# Patient Record
Sex: FEMALE | Race: BLACK OR AFRICAN AMERICAN | NOT HISPANIC OR LATINO | Employment: UNEMPLOYED | URBAN - METROPOLITAN AREA
[De-identification: names, ages, dates, MRNs, and addresses within clinical notes are randomized per-mention and may not be internally consistent; named-entity substitution may affect disease eponyms.]

---

## 2017-01-05 DIAGNOSIS — F98.8 ADD (ATTENTION DEFICIT DISORDER): ICD-10-CM

## 2017-01-05 RX ORDER — DEXTROAMPHETAMINE SACCHARATE, AMPHETAMINE ASPARTATE, DEXTROAMPHETAMINE SULFATE AND AMPHETAMINE SULFATE 7.5; 7.5; 7.5; 7.5 MG/1; MG/1; MG/1; MG/1
30 TABLET ORAL 2 TIMES DAILY
Qty: 60 TABLET | Refills: 0 | Status: SHIPPED | OUTPATIENT
Start: 2017-01-05 | End: 2017-01-31 | Stop reason: SDUPTHER

## 2017-01-25 ENCOUNTER — OFFICE VISIT (OUTPATIENT)
Dept: FAMILY MEDICINE CLINIC | Facility: CLINIC | Age: 27
End: 2017-01-25

## 2017-01-25 VITALS
TEMPERATURE: 98.7 F | HEART RATE: 117 BPM | OXYGEN SATURATION: 97 % | WEIGHT: 157.6 LBS | BODY MASS INDEX: 29 KG/M2 | SYSTOLIC BLOOD PRESSURE: 106 MMHG | HEIGHT: 62 IN | DIASTOLIC BLOOD PRESSURE: 66 MMHG

## 2017-01-25 DIAGNOSIS — J01.90 ACUTE SINUSITIS, RECURRENCE NOT SPECIFIED, UNSPECIFIED LOCATION: ICD-10-CM

## 2017-01-25 DIAGNOSIS — M35.2 BEHCET'S SYNDROME (HCC): Primary | ICD-10-CM

## 2017-01-25 PROCEDURE — 99214 OFFICE O/P EST MOD 30 MIN: CPT | Performed by: FAMILY MEDICINE

## 2017-01-25 RX ORDER — PREDNISONE 20 MG/1
20 TABLET ORAL 3 TIMES DAILY
Qty: 21 TABLET | Refills: 0 | Status: SHIPPED | OUTPATIENT
Start: 2017-01-25 | End: 2017-02-01

## 2017-01-25 RX ORDER — LEVOFLOXACIN 500 MG/1
500 TABLET, FILM COATED ORAL DAILY
Qty: 10 TABLET | Refills: 0 | Status: SHIPPED | OUTPATIENT
Start: 2017-01-25 | End: 2017-02-04

## 2017-01-25 RX ORDER — OXYCODONE AND ACETAMINOPHEN 10; 325 MG/1; MG/1
1 TABLET ORAL EVERY 6 HOURS PRN
Qty: 30 TABLET | Refills: 0 | Status: SHIPPED | OUTPATIENT
Start: 2017-01-25 | End: 2017-01-31 | Stop reason: SDUPTHER

## 2017-01-25 NOTE — MR AVS SNAPSHOT
Grisel Allen   1/25/2017 3:45 PM   Office Visit    Provider:  Tanya Rivers MD   Department:  [facility]   Dept Phone:  [phone]                Your Full Care Plan              Today's Medication Changes          These changes are accurate as of: 1/25/17  4:36 PM.  If you have any questions, ask your nurse or doctor.               New Medication(s)Ordered:     levoFLOXacin 500 MG tablet   Commonly known as:  LEVAQUIN   Take 1 tablet by mouth Daily for 10 days.   Started by:  Tanya Rivers MD       predniSONE 20 MG tablet   Commonly known as:  DELTASONE   Take 1 tablet by mouth 3 (Three) Times a Day for 7 days.   Started by:  Tanya Rivers MD            Where to Get Your Medications      These medications were sent to [facility] [address] [place] [address] [address] AT [facility] [phone] [facility] [phone]   [address], [facility] [address]    Hours:  24-hours Phone:  [phone]     levoFLOXacin 500 MG tablet    predniSONE 20 MG tablet         You can get these medications from any pharmacy     Bring a paper prescription for each of these medications     oxyCODONE-acetaminophen  MG per tablet                  Your Updated Medication List          This list is accurate as of: 1/25/17  4:36 PM.  Always use your most recent med list.                ALPRAZolam 1 MG tablet   Commonly known as:  XANAX   Take 1 tablet by mouth 4 (Four) Times a Day As Needed for anxiety for up to 120 days.       amphetamine-dextroamphetamine 30 MG tablet   Commonly known as:  ADDERALL   Take 1 tablet by mouth 2 (Two) Times a Day.       levoFLOXacin 500 MG tablet   Commonly known as:  LEVAQUIN   Take 1 tablet by mouth Daily for 10 days.       MONONESSA 0.25-35 MG-MCG per tablet   Generic drug:  norgestimate-ethinyl estradiol       oxyCODONE-acetaminophen  MG per tablet   Commonly known as:   "PERCOCET   Take 1 tablet by mouth Every 6 (Six) Hours As Needed for moderate pain (4-6).       predniSONE 20 MG tablet   Commonly known as:  DELTASONE   Take 1 tablet by mouth 3 (Three) Times a Day for 7 days.               You Were Diagnosed With        Codes Comments    Behcet's syndrome    -  Primary ICD-10-CM: M35.2  ICD-9-CM: 136.1     Acute sinusitis, recurrence not specified, unspecified location     ICD-10-CM: J01.90  ICD-9-CM: 461.9       Instructions     None    Patient Instructions History      [employer] Signup     [facility] [employer] allows you to send messages to your doctor, view your test results, renew your prescriptions, schedule appointments, and more. To sign up, go to [employer] and click on the Sign Up Now link in the New User? box. Enter your [employer] Activation Code exactly as it appears below along with the last four digits of your Social Security Number and your Date of Birth () to complete the sign-up process. If you do not sign up before the expiration date, you must request a new code.    [employer] Activation Code: [email or web address]  Expires: 2017  4:36 PM    If you have questions, you can email [email or web address] or call [phone] to talk to our [employer] staff. Remember, [employer] is NOT to be used for urgent needs. For medical emergencies, dial 911.               Other Info from Your Visit           Allergies     No Known Allergies      Reason for Visit     Nasal Congestion     Cough     Sore Throat           Vital Signs     Blood Pressure Pulse Temperature Height Weight    106/66 (BP Location: Left arm, Patient Position: Sitting, Cuff Size: Adult) 117 98.7 °F (37.1 °C) (Oral) 62\" (157.5 cm) 157 lb 9.6 oz (71.5 kg)    Last Menstrual Period Oxygen Saturation Body Mass Index Smoking Status       2017 97% 28.83 kg/m2 Former Smoker       Problems and Diagnoses Noted     Behcet's syndrome    Acute sinus infection          "

## 2017-01-25 NOTE — PROGRESS NOTES
Subjective   Grisel Allen is a 26 y.o. female.     History of Present Illness Grisel Allen 26 y.o. female who presents for evaluation of sinus pressure and congestion. Symptoms include ear pressure, sore throat, nasal blockage, post nasal drip and productive cough.  Onset of symptoms was 5 days ago, gradually worsening since that time. Patient denies shortness of breath, wheezing, fever, vomiting.   Evaluation to date: none Treatment to date:  none.  Also continues to complain of a flare of Behcet's      The following portions of the patient's history were reviewed and updated as appropriate: allergies, current medications, past family history, past medical history, past social history, past surgical history and problem list.    Review of Systems   Constitutional: Positive for fatigue. Negative for fever.   HENT: Positive for congestion, postnasal drip, sinus pressure and sore throat.    Respiratory: Positive for cough. Negative for shortness of breath and wheezing.    Gastrointestinal: Negative for nausea and vomiting.   Musculoskeletal: Positive for arthralgias.   Skin: Positive for wound.       Objective   Physical Exam   Constitutional: She appears well-developed and well-nourished.   HENT:   Right Ear: Tympanic membrane normal.   Left Ear: Tympanic membrane normal.   Mouth/Throat: Posterior oropharyngeal erythema present.   Eyes: Conjunctivae and EOM are normal. Pupils are equal, round, and reactive to light.   Cardiovascular: Normal rate, regular rhythm, normal heart sounds and intact distal pulses.    Pulmonary/Chest: Effort normal and breath sounds normal.   Lymphadenopathy:     She has no cervical adenopathy.   Skin: There is erythema (2 ulcerative lesions medial aspect of both breasts).   Psychiatric: She has a normal mood and affect. Her behavior is normal. Judgment and thought content normal.   Vitals reviewed.      Assessment/Plan   Grisel was seen today for nasal congestion, cough and sore  throat.    Diagnoses and all orders for this visit:    Behcet's syndrome  -     predniSONE (DELTASONE) 20 MG tablet; Take 1 tablet by mouth 3 (Three) Times a Day for 7 days.  -     oxyCODONE-acetaminophen (PERCOCET)  MG per tablet; Take 1 tablet by mouth Every 6 (Six) Hours As Needed for moderate pain (4-6).    Acute sinusitis, recurrence not specified, unspecified location  -     levoFLOXacin (LEVAQUIN) 500 MG tablet; Take 1 tablet by mouth Daily for 10 days.

## 2017-01-31 DIAGNOSIS — M35.2 BEHCET'S SYNDROME (HCC): ICD-10-CM

## 2017-01-31 DIAGNOSIS — F98.8 ADD (ATTENTION DEFICIT DISORDER): ICD-10-CM

## 2017-01-31 RX ORDER — OXYCODONE AND ACETAMINOPHEN 10; 325 MG/1; MG/1
1 TABLET ORAL EVERY 6 HOURS PRN
Qty: 30 TABLET | Refills: 0 | Status: SHIPPED | OUTPATIENT
Start: 2017-01-31 | End: 2017-02-24 | Stop reason: SDUPTHER

## 2017-01-31 RX ORDER — DEXTROAMPHETAMINE SACCHARATE, AMPHETAMINE ASPARTATE, DEXTROAMPHETAMINE SULFATE AND AMPHETAMINE SULFATE 7.5; 7.5; 7.5; 7.5 MG/1; MG/1; MG/1; MG/1
30 TABLET ORAL 2 TIMES DAILY
Qty: 60 TABLET | Refills: 0 | Status: SHIPPED | OUTPATIENT
Start: 2017-01-31 | End: 2017-02-24

## 2017-01-31 NOTE — TELEPHONE ENCOUNTER
Request refill. Last OV 12/9/16 for adderall. She is also needing a refill on percocet she did get a refill on 1/25/17. She states that she has been in more pain.

## 2017-02-10 DIAGNOSIS — J01.90 ACUTE SINUSITIS, RECURRENCE NOT SPECIFIED, UNSPECIFIED LOCATION: ICD-10-CM

## 2017-02-10 DIAGNOSIS — M35.2 BEHCET'S SYNDROME (HCC): ICD-10-CM

## 2017-02-10 RX ORDER — LEVOFLOXACIN 500 MG/1
TABLET, FILM COATED ORAL
Qty: 10 TABLET | Refills: 0 | OUTPATIENT
Start: 2017-02-10

## 2017-02-10 RX ORDER — PREDNISONE 20 MG/1
TABLET ORAL
Qty: 21 TABLET | Refills: 0 | OUTPATIENT
Start: 2017-02-10

## 2017-02-24 ENCOUNTER — OFFICE VISIT (OUTPATIENT)
Dept: FAMILY MEDICINE CLINIC | Facility: CLINIC | Age: 27
End: 2017-02-24

## 2017-02-24 VITALS
WEIGHT: 157 LBS | HEART RATE: 85 BPM | BODY MASS INDEX: 28.89 KG/M2 | DIASTOLIC BLOOD PRESSURE: 72 MMHG | RESPIRATION RATE: 16 BRPM | TEMPERATURE: 98.3 F | HEIGHT: 62 IN | OXYGEN SATURATION: 96 % | SYSTOLIC BLOOD PRESSURE: 118 MMHG

## 2017-02-24 DIAGNOSIS — M35.9 AUTOIMMUNE DISEASE (HCC): ICD-10-CM

## 2017-02-24 DIAGNOSIS — F98.8 ADD (ATTENTION DEFICIT DISORDER): ICD-10-CM

## 2017-02-24 DIAGNOSIS — M35.2 BEHCET'S SYNDROME (HCC): ICD-10-CM

## 2017-02-24 DIAGNOSIS — F41.9 ANXIETY: Primary | ICD-10-CM

## 2017-02-24 DIAGNOSIS — F34.1 DYSTHYMIA: ICD-10-CM

## 2017-02-24 PROCEDURE — 99213 OFFICE O/P EST LOW 20 MIN: CPT | Performed by: PHYSICIAN ASSISTANT

## 2017-02-24 RX ORDER — OXYCODONE AND ACETAMINOPHEN 10; 325 MG/1; MG/1
1 TABLET ORAL EVERY 6 HOURS PRN
Qty: 30 TABLET | Refills: 0 | Status: SHIPPED | OUTPATIENT
Start: 2017-02-24 | End: 2017-04-14

## 2017-02-24 RX ORDER — ALPRAZOLAM 1 MG/1
1 TABLET ORAL 2 TIMES DAILY PRN
Qty: 60 TABLET | Refills: 2
Start: 2017-02-24 | End: 2018-03-29

## 2017-02-24 RX ORDER — PREDNISONE 20 MG/1
20 TABLET ORAL 2 TIMES DAILY
Qty: 14 TABLET | Refills: 0 | Status: SHIPPED | OUTPATIENT
Start: 2017-02-24 | End: 2017-04-14

## 2017-02-24 RX ORDER — DEXTROAMPHETAMINE SACCHARATE, AMPHETAMINE ASPARTATE, DEXTROAMPHETAMINE SULFATE AND AMPHETAMINE SULFATE 3.75; 3.75; 3.75; 3.75 MG/1; MG/1; MG/1; MG/1
15 TABLET ORAL 2 TIMES DAILY
Qty: 60 TABLET | Refills: 0
Start: 2017-02-24 | End: 2017-04-14

## 2017-02-24 NOTE — PROGRESS NOTES
Subjective   Grisel Allen is a 26 y.o. female.     History of Present Illness     Grisel Allen female 26 y.o. who presents today for follow up of Depression, Anxiety and ADD.  She reports she is doing well on meds and wants to cont.  Patient comes in today to see me for the first time. Is a long term patient of Dr. Rivers. Seeing me today due to Dr. Rivers no longer working here. Discussed with pt today that I will not be writing pain medications or benzodiazepines for them past a 90 day window and that we will be aggressively decreasing doses along the way. They have a choice of being sent to pain management for any narcotics taken, if they wish. I will be getting them off of any benzos.they are taking, or they can see a psychiatrist to discuss this further (list of names and phone numbers given to pt today). They are also aware that they are free to transfer their care to a different facility before the 90 days are up if they wish. After that point, they are aware that they will no longer be seen in this facility by our doctors. Again, reiterated the fact that those types of medications will not be written here past 90 days, and pt voices understanding.. Onset of symptoms was approximately several years ago.  She denies current suicidal and homicidal ideation. Risk factors are chronic illness and chronic pain.  Previous treatment includes current Rx.  She complains of the following medication side effects: none.  The patient has previously been in counseling..    The patient has read and signed the [facility] Controlled Substance Contract.  I will continue to see patient for regular follow up appointments.  They are well controlled on their medication.  DIDI has been reviewed by me and is updated every 3 months. The patient is aware of the potential for addiction and dependence.    She is having a flare of her ulcers vaginally, buttock, and mouth    The following portions of the patient's history were  reviewed and updated as appropriate: allergies, current medications, past family history, past medical history, past social history, past surgical history and problem list.    Review of Systems   Constitutional: Negative for activity change, appetite change and unexpected weight change.   HENT: Positive for mouth sores. Negative for nosebleeds and trouble swallowing.    Eyes: Negative for pain and visual disturbance.   Respiratory: Negative for chest tightness, shortness of breath and wheezing.    Cardiovascular: Negative for chest pain and palpitations.   Gastrointestinal: Positive for rectal pain. Negative for abdominal pain and blood in stool.   Endocrine: Negative.    Genitourinary: Negative for difficulty urinating and hematuria.   Musculoskeletal: Positive for joint swelling.   Skin: Positive for wound. Negative for color change and rash.   Allergic/Immunologic: Negative.    Neurological: Negative for syncope and speech difficulty.   Hematological: Negative for adenopathy.   Psychiatric/Behavioral: Negative for agitation and confusion.   All other systems reviewed and are negative.      Objective   Physical Exam   Constitutional: She is oriented to person, place, and time. She appears well-developed and well-nourished. No distress.   HENT:   Head: Normocephalic.   Right Ear: Tympanic membrane normal.   Left Ear: Tympanic membrane normal.   Mouth/Throat: Oral lesions (multiple ulcers on buccal membranes) present.   Eyes: Conjunctivae and EOM are normal. Pupils are equal, round, and reactive to light.   Neck: Normal range of motion. Neck supple.   Cardiovascular: Normal rate, regular rhythm and normal heart sounds.    No murmur heard.  Pulmonary/Chest: Effort normal and breath sounds normal.   Musculoskeletal: Normal range of motion.   Lymphadenopathy:     She has no cervical adenopathy.   Neurological: She is alert and oriented to person, place, and time.   Skin: Skin is warm and dry. No rash noted. She is not  diaphoretic. There is erythema (ulceration gluteal cleft without surrounding erythema; left labia minora to majora cluster of ulcers and no surrounding erythema; while centered).   Psychiatric: She has a normal mood and affect. Her behavior is normal. Judgment and thought content normal. Her affect is not inappropriate. She is not actively hallucinating. Cognition and memory are normal.   Nursing note and vitals reviewed.      Assessment/Plan   Problems Addressed this Visit        Immune and Lymphatic    Autoimmune disease       Other    Anxiety - Primary    ADD (attention deficit disorder)    Behcet's syndrome    Depression        I have reviewed the notes, assessments, and/or procedures performed by Chanel Resendez PA-C, I concur with her/his documentation of Grisel Allen.

## 2017-02-24 NOTE — PATIENT INSTRUCTIONS
Stop Adderall if it makes your heart pound and/or race.  It can effect you being able to fall asleep.  It can be habit forming.  Do not share you medication with anyone.  Do not drink alcohol with this medication.  Use the lowest effective dose.  Xanax is a controlled substance.  I only want you to take it as needed.  I do not want you to take it routinely.  Use the lowest effective dose.  It can be habit forming.  It can make you feel drowsy.   This could effect how you operate machinery, including a car.  Caution is advised.  I would avoid taking it and then driving.  Do not take this with alcohol.  Warned patient that this medication can cause drowsiness and impair them operating machinery, including driving a car.  Caution is advised.    Notify me if secondary skin infection

## 2017-05-06 ENCOUNTER — HOSPITAL ENCOUNTER (EMERGENCY)
Facility: HOSPITAL | Age: 27
Discharge: HOME | End: 2017-05-06
Attending: EMERGENCY MEDICINE | Admitting: EMERGENCY MEDICINE

## 2017-05-06 VITALS
BODY MASS INDEX: 30.91 KG/M2 | DIASTOLIC BLOOD PRESSURE: 76 MMHG | SYSTOLIC BLOOD PRESSURE: 120 MMHG | HEART RATE: 98 BPM | WEIGHT: 168 LBS | RESPIRATION RATE: 18 BRPM | OXYGEN SATURATION: 95 % | HEIGHT: 62 IN | TEMPERATURE: 98.6 F

## 2017-05-06 DIAGNOSIS — M35.2 BEHCET'S DISEASE (HCC): ICD-10-CM

## 2017-05-06 DIAGNOSIS — K13.70 LESION OF MOUTH: Primary | ICD-10-CM

## 2017-05-06 LAB
ANION GAP SERPL CALCULATED.3IONS-SCNC: 18.2 MMOL/L
BASOPHILS # BLD AUTO: 0.02 10*3/MM3 (ref 0–0.2)
BASOPHILS NFR BLD AUTO: 0.1 % (ref 0–1.5)
BUN BLD-MCNC: 12 MG/DL (ref 6–20)
BUN/CREAT SERPL: 21.1 (ref 7–25)
CALCIUM SPEC-SCNC: 9.3 MG/DL (ref 8.6–10.5)
CHLORIDE SERPL-SCNC: 94 MMOL/L (ref 98–107)
CO2 SERPL-SCNC: 21.8 MMOL/L (ref 22–29)
CREAT BLD-MCNC: 0.57 MG/DL (ref 0.57–1)
DEPRECATED RDW RBC AUTO: 65.9 FL (ref 37–54)
EOSINOPHIL # BLD AUTO: 0.04 10*3/MM3 (ref 0–0.7)
EOSINOPHIL NFR BLD AUTO: 0.2 % (ref 0.3–6.2)
ERYTHROCYTE [DISTWIDTH] IN BLOOD BY AUTOMATED COUNT: 17.3 % (ref 11.7–13)
GFR SERPL CREATININE-BSD FRML MDRD: >150 ML/MIN/1.73
GLUCOSE BLD-MCNC: 85 MG/DL (ref 65–99)
HCT VFR BLD AUTO: 40.3 % (ref 35.6–45.5)
HGB BLD-MCNC: 13 G/DL (ref 11.9–15.5)
IMM GRANULOCYTES # BLD: 0.11 10*3/MM3 (ref 0–0.03)
IMM GRANULOCYTES NFR BLD: 0.7 % (ref 0–0.5)
LYMPHOCYTES # BLD AUTO: 2.58 10*3/MM3 (ref 0.9–4.8)
LYMPHOCYTES NFR BLD AUTO: 15.8 % (ref 19.6–45.3)
MCH RBC QN AUTO: 33.7 PG (ref 26.9–32)
MCHC RBC AUTO-ENTMCNC: 32.3 G/DL (ref 32.4–36.3)
MCV RBC AUTO: 104.4 FL (ref 80.5–98.2)
MONOCYTES # BLD AUTO: 2.4 10*3/MM3 (ref 0.2–1.2)
MONOCYTES NFR BLD AUTO: 14.7 % (ref 5–12)
NEUTROPHILS # BLD AUTO: 11.13 10*3/MM3 (ref 1.9–8.1)
NEUTROPHILS NFR BLD AUTO: 68.5 % (ref 42.7–76)
PLATELET # BLD AUTO: 350 10*3/MM3 (ref 140–500)
PMV BLD AUTO: 9.5 FL (ref 6–12)
POTASSIUM BLD-SCNC: 4.1 MMOL/L (ref 3.5–5.2)
RBC # BLD AUTO: 3.86 10*6/MM3 (ref 3.9–5.2)
SODIUM BLD-SCNC: 134 MMOL/L (ref 136–145)
WBC NRBC COR # BLD: 16.28 10*3/MM3 (ref 4.5–10.7)

## 2017-05-06 PROCEDURE — 96361 HYDRATE IV INFUSION ADD-ON: CPT

## 2017-05-06 PROCEDURE — 25010000002 HYDROMORPHONE PER 4 MG: Performed by: EMERGENCY MEDICINE

## 2017-05-06 PROCEDURE — 99283 EMERGENCY DEPT VISIT LOW MDM: CPT

## 2017-05-06 PROCEDURE — 80048 BASIC METABOLIC PNL TOTAL CA: CPT | Performed by: EMERGENCY MEDICINE

## 2017-05-06 PROCEDURE — 85025 COMPLETE CBC W/AUTO DIFF WBC: CPT | Performed by: EMERGENCY MEDICINE

## 2017-05-06 PROCEDURE — 25010000002 METHYLPREDNISOLONE PER 125 MG: Performed by: EMERGENCY MEDICINE

## 2017-05-06 PROCEDURE — 96374 THER/PROPH/DIAG INJ IV PUSH: CPT

## 2017-05-06 PROCEDURE — 25010000002 ONDANSETRON PER 1 MG: Performed by: EMERGENCY MEDICINE

## 2017-05-06 PROCEDURE — 96375 TX/PRO/DX INJ NEW DRUG ADDON: CPT

## 2017-05-06 RX ORDER — OXYCODONE AND ACETAMINOPHEN 7.5; 325 MG/1; MG/1
1 TABLET ORAL EVERY 6 HOURS PRN
Qty: 20 TABLET | Refills: 0 | Status: SHIPPED | OUTPATIENT
Start: 2017-05-06 | End: 2017-05-18

## 2017-05-06 RX ORDER — TRIAMCINOLONE ACETONIDE 0.1 %
PASTE (GRAM) DENTAL
Qty: 5 G | Refills: 1 | Status: SHIPPED | OUTPATIENT
Start: 2017-05-06 | End: 2017-05-18

## 2017-05-06 RX ORDER — METHYLPREDNISOLONE SODIUM SUCCINATE 125 MG/2ML
125 INJECTION, POWDER, LYOPHILIZED, FOR SOLUTION INTRAMUSCULAR; INTRAVENOUS ONCE
Status: COMPLETED | OUTPATIENT
Start: 2017-05-06 | End: 2017-05-06

## 2017-05-06 RX ORDER — ONDANSETRON 2 MG/ML
4 INJECTION INTRAMUSCULAR; INTRAVENOUS ONCE
Status: COMPLETED | OUTPATIENT
Start: 2017-05-06 | End: 2017-05-06

## 2017-05-06 RX ADMIN — Medication 10 ML: at 15:28

## 2017-05-06 RX ADMIN — METHYLPREDNISOLONE SODIUM SUCCINATE 125 MG: 125 INJECTION, POWDER, FOR SOLUTION INTRAMUSCULAR; INTRAVENOUS at 15:12

## 2017-05-06 RX ADMIN — SODIUM CHLORIDE 1000 ML: 9 INJECTION, SOLUTION INTRAVENOUS at 15:11

## 2017-05-06 RX ADMIN — HYDROMORPHONE HYDROCHLORIDE 1 MG: 1 INJECTION, SOLUTION INTRAMUSCULAR; INTRAVENOUS; SUBCUTANEOUS at 15:12

## 2017-05-06 RX ADMIN — ONDANSETRON 4 MG: 2 INJECTION INTRAMUSCULAR; INTRAVENOUS at 15:11

## 2017-06-29 ENCOUNTER — HOSPITAL ENCOUNTER (EMERGENCY)
Facility: HOSPITAL | Age: 27
Discharge: HOME | End: 2017-06-29
Attending: EMERGENCY MEDICINE | Admitting: EMERGENCY MEDICINE

## 2017-06-29 VITALS
OXYGEN SATURATION: 98 % | DIASTOLIC BLOOD PRESSURE: 62 MMHG | TEMPERATURE: 98 F | HEART RATE: 72 BPM | SYSTOLIC BLOOD PRESSURE: 102 MMHG | HEIGHT: 62 IN | WEIGHT: 145 LBS | BODY MASS INDEX: 26.68 KG/M2 | RESPIRATION RATE: 16 BRPM

## 2017-06-29 DIAGNOSIS — K13.79 RECURRENT ORAL ULCERS: Primary | ICD-10-CM

## 2017-06-29 DIAGNOSIS — M35.2 BEHCET'S DISEASE (HCC): ICD-10-CM

## 2017-06-29 PROCEDURE — 96372 THER/PROPH/DIAG INJ SC/IM: CPT

## 2017-06-29 PROCEDURE — 99283 EMERGENCY DEPT VISIT LOW MDM: CPT

## 2017-06-29 PROCEDURE — 25010000002 DEXAMETHASONE SODIUM PHOSPHATE 20 MG/5ML SOLUTION: Performed by: EMERGENCY MEDICINE

## 2017-06-29 RX ORDER — METHYLPREDNISOLONE 4 MG/1
TABLET ORAL
Qty: 21 TABLET | Refills: 0 | Status: SHIPPED | OUTPATIENT
Start: 2017-06-29 | End: 2017-08-14

## 2017-06-29 RX ORDER — DOXYCYCLINE 100 MG/1
100 CAPSULE ORAL 2 TIMES DAILY
Qty: 20 CAPSULE | Refills: 0 | Status: SHIPPED | OUTPATIENT
Start: 2017-06-29 | End: 2018-03-29

## 2017-06-29 RX ORDER — DEXAMETHASONE SODIUM PHOSPHATE 4 MG/ML
10 INJECTION, SOLUTION INTRA-ARTICULAR; INTRALESIONAL; INTRAMUSCULAR; INTRAVENOUS; SOFT TISSUE ONCE
Status: COMPLETED | OUTPATIENT
Start: 2017-06-29 | End: 2017-06-29

## 2017-06-29 RX ADMIN — DEXAMETHASONE SODIUM PHOSPHATE 10 MG: 4 INJECTION, SOLUTION INTRAMUSCULAR; INTRAVENOUS at 23:15

## 2017-07-03 ENCOUNTER — TELEPHONE (OUTPATIENT)
Dept: SOCIAL WORK | Facility: HOSPITAL | Age: 27
End: 2017-07-03

## 2017-08-01 ENCOUNTER — HOSPITAL ENCOUNTER (EMERGENCY)
Facility: HOSPITAL | Age: 27
Discharge: LEFT BEFORE DISCHARGE | End: 2017-08-01

## 2017-08-01 VITALS
DIASTOLIC BLOOD PRESSURE: 73 MMHG | SYSTOLIC BLOOD PRESSURE: 123 MMHG | RESPIRATION RATE: 16 BRPM | HEIGHT: 62 IN | WEIGHT: 150 LBS | HEART RATE: 135 BPM | OXYGEN SATURATION: 99 % | TEMPERATURE: 99.8 F | BODY MASS INDEX: 27.6 KG/M2

## 2017-08-01 PROCEDURE — 99211 OFF/OP EST MAY X REQ PHY/QHP: CPT

## 2017-08-01 RX ORDER — HYDROCODONE BITARTRATE AND ACETAMINOPHEN 5; 325 MG/1; MG/1
TABLET ORAL
Refills: 0 | COMMUNITY
Start: 2017-07-03 | End: 2018-03-29

## 2017-08-01 NOTE — ED TRIAGE NOTES
"Pt states smashed left lower leg in car door 2 months ago, states \"theres a lump there and you can stick a pin in it and I can't feel it\" for one month  "

## 2017-08-01 NOTE — ED TRIAGE NOTES
Pt reports an area of swelling to left leg after hitting car door on it 2 moths ago.  No swelling noted at this time to left lower leg.    Pt is ambulatory in triage.

## 2017-08-14 ENCOUNTER — HOSPITAL ENCOUNTER (EMERGENCY)
Facility: HOSPITAL | Age: 27
Discharge: HOME | End: 2017-08-14
Attending: EMERGENCY MEDICINE | Admitting: EMERGENCY MEDICINE

## 2017-08-14 VITALS
BODY MASS INDEX: 26.58 KG/M2 | OXYGEN SATURATION: 98 % | TEMPERATURE: 99.4 F | HEART RATE: 107 BPM | WEIGHT: 150 LBS | HEIGHT: 63 IN | DIASTOLIC BLOOD PRESSURE: 64 MMHG | RESPIRATION RATE: 18 BRPM | SYSTOLIC BLOOD PRESSURE: 111 MMHG

## 2017-08-14 DIAGNOSIS — M35.2 BEHCET'S SYNDROME (HCC): ICD-10-CM

## 2017-08-14 DIAGNOSIS — G89.29 OTHER CHRONIC PAIN: ICD-10-CM

## 2017-08-14 DIAGNOSIS — N39.0 ACUTE UTI: ICD-10-CM

## 2017-08-14 DIAGNOSIS — K13.70 UNSPECIFIED LESIONS OF ORAL MUCOSA: Primary | ICD-10-CM

## 2017-08-14 LAB
ALBUMIN SERPL-MCNC: 4.4 G/DL (ref 3.5–5.2)
ALBUMIN/GLOB SERPL: 1.3 G/DL
ALP SERPL-CCNC: 78 U/L (ref 39–117)
ALT SERPL W P-5'-P-CCNC: 13 U/L (ref 1–33)
AMPHET+METHAMPHET UR QL: NEGATIVE
ANION GAP SERPL CALCULATED.3IONS-SCNC: 13.7 MMOL/L
AST SERPL-CCNC: 17 U/L (ref 1–32)
BACTERIA UR QL AUTO: ABNORMAL /HPF
BARBITURATES UR QL SCN: NEGATIVE
BASOPHILS # BLD AUTO: 0.01 10*3/MM3 (ref 0–0.2)
BASOPHILS NFR BLD AUTO: 0.1 % (ref 0–1.5)
BENZODIAZ UR QL SCN: POSITIVE
BILIRUB SERPL-MCNC: 0.3 MG/DL (ref 0.1–1.2)
BILIRUB UR QL STRIP: NEGATIVE
BUN BLD-MCNC: 6 MG/DL (ref 6–20)
BUN/CREAT SERPL: 8 (ref 7–25)
CALCIUM SPEC-SCNC: 9.7 MG/DL (ref 8.6–10.5)
CANNABINOIDS SERPL QL: POSITIVE
CHLORIDE SERPL-SCNC: 102 MMOL/L (ref 98–107)
CLARITY UR: ABNORMAL
CO2 SERPL-SCNC: 26.3 MMOL/L (ref 22–29)
COCAINE UR QL: POSITIVE
COLOR UR: ABNORMAL
CREAT BLD-MCNC: 0.75 MG/DL (ref 0.57–1)
DEPRECATED RDW RBC AUTO: 62 FL (ref 37–54)
EOSINOPHIL # BLD AUTO: 0.16 10*3/MM3 (ref 0–0.7)
EOSINOPHIL NFR BLD AUTO: 1.3 % (ref 0.3–6.2)
ERYTHROCYTE [DISTWIDTH] IN BLOOD BY AUTOMATED COUNT: 16.4 % (ref 11.7–13)
GFR SERPL CREATININE-BSD FRML MDRD: 114 ML/MIN/1.73
GLOBULIN UR ELPH-MCNC: 3.5 GM/DL
GLUCOSE BLD-MCNC: 103 MG/DL (ref 65–99)
GLUCOSE UR STRIP-MCNC: NEGATIVE MG/DL
HCG SERPL QL: NEGATIVE
HCT VFR BLD AUTO: 37.4 % (ref 35.6–45.5)
HGB BLD-MCNC: 12.2 G/DL (ref 11.9–15.5)
HGB UR QL STRIP.AUTO: NEGATIVE
HYALINE CASTS UR QL AUTO: ABNORMAL /LPF
IMM GRANULOCYTES # BLD: 0.05 10*3/MM3 (ref 0–0.03)
IMM GRANULOCYTES NFR BLD: 0.4 % (ref 0–0.5)
KETONES UR QL STRIP: ABNORMAL
LEUKOCYTE ESTERASE UR QL STRIP.AUTO: ABNORMAL
LYMPHOCYTES # BLD AUTO: 1 10*3/MM3 (ref 0.9–4.8)
LYMPHOCYTES NFR BLD AUTO: 7.9 % (ref 19.6–45.3)
MCH RBC QN AUTO: 33.7 PG (ref 26.9–32)
MCHC RBC AUTO-ENTMCNC: 32.6 G/DL (ref 32.4–36.3)
MCV RBC AUTO: 103.3 FL (ref 80.5–98.2)
METHADONE UR QL SCN: NEGATIVE
MONOCYTES # BLD AUTO: 2.02 10*3/MM3 (ref 0.2–1.2)
MONOCYTES NFR BLD AUTO: 15.9 % (ref 5–12)
NEUTROPHILS # BLD AUTO: 9.47 10*3/MM3 (ref 1.9–8.1)
NEUTROPHILS NFR BLD AUTO: 74.4 % (ref 42.7–76)
NITRITE UR QL STRIP: NEGATIVE
OPIATES UR QL: POSITIVE
OXYCODONE UR QL SCN: NEGATIVE
PH UR STRIP.AUTO: 7.5 [PH] (ref 5–8)
PLATELET # BLD AUTO: 299 10*3/MM3 (ref 140–500)
PMV BLD AUTO: 10 FL (ref 6–12)
POTASSIUM BLD-SCNC: 3.4 MMOL/L (ref 3.5–5.2)
PROT SERPL-MCNC: 7.9 G/DL (ref 6–8.5)
PROT UR QL STRIP: ABNORMAL
RBC # BLD AUTO: 3.62 10*6/MM3 (ref 3.9–5.2)
RBC # UR: ABNORMAL /HPF
REF LAB TEST METHOD: ABNORMAL
SODIUM BLD-SCNC: 142 MMOL/L (ref 136–145)
SP GR UR STRIP: 1.02 (ref 1–1.03)
SQUAMOUS #/AREA URNS HPF: ABNORMAL /HPF
UROBILINOGEN UR QL STRIP: ABNORMAL
WBC NRBC COR # BLD: 12.71 10*3/MM3 (ref 4.5–10.7)
WBC UR QL AUTO: ABNORMAL /HPF

## 2017-08-14 PROCEDURE — 96374 THER/PROPH/DIAG INJ IV PUSH: CPT

## 2017-08-14 PROCEDURE — 96375 TX/PRO/DX INJ NEW DRUG ADDON: CPT

## 2017-08-14 PROCEDURE — 99284 EMERGENCY DEPT VISIT MOD MDM: CPT

## 2017-08-14 PROCEDURE — 87086 URINE CULTURE/COLONY COUNT: CPT | Performed by: EMERGENCY MEDICINE

## 2017-08-14 PROCEDURE — 80307 DRUG TEST PRSMV CHEM ANLYZR: CPT | Performed by: EMERGENCY MEDICINE

## 2017-08-14 PROCEDURE — 25010000003 HYDROCORTISONE SOD SUCCINATE PF 250 MG RECONSTITUTED SOLUTION: Performed by: EMERGENCY MEDICINE

## 2017-08-14 PROCEDURE — 25010000002 ONDANSETRON PER 1 MG: Performed by: EMERGENCY MEDICINE

## 2017-08-14 PROCEDURE — 80053 COMPREHEN METABOLIC PANEL: CPT | Performed by: EMERGENCY MEDICINE

## 2017-08-14 PROCEDURE — 84703 CHORIONIC GONADOTROPIN ASSAY: CPT | Performed by: EMERGENCY MEDICINE

## 2017-08-14 PROCEDURE — 85025 COMPLETE CBC W/AUTO DIFF WBC: CPT | Performed by: EMERGENCY MEDICINE

## 2017-08-14 PROCEDURE — 96361 HYDRATE IV INFUSION ADD-ON: CPT

## 2017-08-14 PROCEDURE — 25010000002 HYDROMORPHONE PER 4 MG: Performed by: EMERGENCY MEDICINE

## 2017-08-14 PROCEDURE — 81001 URINALYSIS AUTO W/SCOPE: CPT | Performed by: EMERGENCY MEDICINE

## 2017-08-14 RX ORDER — SULFAMETHOXAZOLE AND TRIMETHOPRIM 800; 160 MG/1; MG/1
1 TABLET ORAL 2 TIMES DAILY
Qty: 14 TABLET | Refills: 0 | Status: SHIPPED | OUTPATIENT
Start: 2017-08-14 | End: 2018-05-09

## 2017-08-14 RX ORDER — METHYLPREDNISOLONE 4 MG/1
TABLET ORAL
Qty: 21 TABLET | Refills: 0 | Status: SHIPPED | OUTPATIENT
Start: 2017-08-14 | End: 2018-03-08

## 2017-08-14 RX ORDER — ONDANSETRON 2 MG/ML
4 INJECTION INTRAMUSCULAR; INTRAVENOUS ONCE
Status: COMPLETED | OUTPATIENT
Start: 2017-08-14 | End: 2017-08-14

## 2017-08-14 RX ADMIN — ONDANSETRON 4 MG: 2 INJECTION INTRAMUSCULAR; INTRAVENOUS at 12:54

## 2017-08-14 RX ADMIN — HYDROMORPHONE HYDROCHLORIDE 1 MG: 1 INJECTION, SOLUTION INTRAMUSCULAR; INTRAVENOUS; SUBCUTANEOUS at 12:55

## 2017-08-14 RX ADMIN — SODIUM CHLORIDE 1000 ML: 9 INJECTION, SOLUTION INTRAVENOUS at 12:41

## 2017-08-14 RX ADMIN — HYDROCORTISONE SODIUM SUCCINATE 250 MG: 250 INJECTION, POWDER, FOR SOLUTION INTRAMUSCULAR; INTRAVENOUS at 12:49

## 2017-08-14 NOTE — ED NOTES
Pt discharged to waiting room. Given instructions and awaiting a ride.      Shar Garcia RN  08/14/17 [id number]

## 2017-08-14 NOTE — ED PROVIDER NOTES
" EMERGENCY DEPARTMENT ENCOUNTER    CHIEF COMPLAINT  Chief Complaint: difficulty swallowing  History given by: patient  History limited by: nothing  Room Number: 24/24  PMD: Bandar Morel MD      HPI:  Pt is a 26 y.o. female with a history of Behcet's Syndrome, who presents complaining of difficulty swallowing for the last several days. Pt states that she has oral lesions which make it painful to swallow. Pt denies any additional symptoms. Pt states that she has a history of similar flare ups with her Behcet's Syndrome, which improve with steroids. Pt states that she has not seen a PMD or rheumatologist since her PMD retired over 6 months ago.    Duration:  Several days  Onset: gradual  Timing: constant  Location: oral  Quality: \"pain\"  Intensity/Severity: moderate to severe  Progression: worsening  Associated Symptoms: oral lesions  Aggravating Factors: swallowing  Alleviating Factors: none  Previous Episodes: Pt states that she has a history of similar flare ups with her Behcet's Syndrome, which improve with steroids.  Treatment before arrival: none    PAST MEDICAL HISTORY  Active Ambulatory Problems     Diagnosis Date Noted   • Anxiety 01/04/2016   • ADD (attention deficit disorder) 01/04/2016   • Behcet's syndrome 01/04/2016   • Autoimmune disease    • Depression      Resolved Ambulatory Problems     Diagnosis Date Noted   • No Resolved Ambulatory Problems     Past Medical History:   Diagnosis Date   • ADD (attention deficit disorder)    • Anxiety    • Autoimmune disease    • Behcet's disease    • Depression    • Eye exam, routine 2013   • Pap smear, as part of routine gynecological examination 01/2015       PAST SURGICAL HISTORY  Past Surgical History:   Procedure Laterality Date   • BREAST CYST EXCISION  2010   • DECUBITUS ULCER EXCISION     • TONSILLECTOMY  2004       FAMILY HISTORY  Family History   Problem Relation Age of Onset   • Diabetes Mother        SOCIAL HISTORY  Social History     Social History "   • Marital status: Single     Spouse name: N/A   • Number of children: N/A   • Years of education: N/A     Occupational History   • Not on file.     Social History Main Topics   • Smoking status: Current Every Day Smoker     Packs/day: 0.50     Types: Cigarettes   • Smokeless tobacco: Never Used   • Alcohol use Yes      Comment: occ   • Drug use: No   • Sexual activity: Defer     Other Topics Concern   • Not on file     Social History Narrative       ALLERGIES  Review of patient's allergies indicates no known allergies.    REVIEW OF SYSTEMS  Review of Systems   Constitutional: Negative for fever.   HENT: Positive for trouble swallowing.    Respiratory: Negative for cough and shortness of breath.    Cardiovascular: Negative for chest pain.   Gastrointestinal: Negative for abdominal pain, diarrhea and vomiting.   Genitourinary: Negative for dysuria.   Musculoskeletal: Negative for neck pain.   Skin: Positive for wound (oral lesions). Negative for rash.   Neurological: Negative for weakness, numbness and headaches.   Psychiatric/Behavioral: Negative.    All other systems reviewed and are negative.      PHYSICAL EXAM  ED Triage Vitals   Temp Heart Rate Resp BP SpO2   08/14/17 1017 08/14/17 1017 08/14/17 1017 08/14/17 1132 08/14/17 1017   100.4 °F (38 °C) 103 18 118/65 99 %      Temp src Heart Rate Source Patient Position BP Location FiO2 (%)   08/14/17 1017 08/14/17 1017 -- -- --   Tympanic Monitor          Physical Exam   Constitutional: She is oriented to person, place, and time. No distress.   Pt appears to be in pain   HENT:   Head: Normocephalic and atraumatic.   Mouth/Throat: Mucous membranes are not dry.   Multiple ulcerated lesions on the cheeks and soft palate    Eyes: EOM are normal. Pupils are equal, round, and reactive to light.   Neck: Normal range of motion. Neck supple.   Cardiovascular: Regular rhythm and normal heart sounds.  Tachycardia present.    XL=152   Pulmonary/Chest: Effort normal and breath  sounds normal. No respiratory distress.   Abdominal: Soft. There is no tenderness. There is no rebound and no guarding.   Musculoskeletal: Normal range of motion. She exhibits no edema.   Neurological: She is alert and oriented to person, place, and time. She has normal sensation and normal strength.   Normal neurologic exam   Skin: Skin is warm and dry. No rash noted.   Psychiatric: Mood and affect normal.   Nursing note and vitals reviewed.      LAB RESULTS  Lab Results (last 24 hours)     Procedure Component Value Units Date/Time    CBC & Differential [[id number]] Collected:  08/14/17 1229    Specimen:  Blood Updated:  08/14/17 1253    Narrative:       The following orders were created for panel order CBC & Differential.  Procedure                               Abnormality         Status                     ---------                               -----------         ------                     CBC Auto Differential[[id number]]         Abnormal            Final result                 Please view results for these tests on the individual orders.    CBC Auto Differential [[id number]]  (Abnormal) Collected:  08/14/17 1229    Specimen:  Blood Updated:  08/14/17 1253     WBC 12.71 (H) 10*3/mm3      RBC 3.62 (L) 10*6/mm3      Hemoglobin 12.2 g/dL      Hematocrit 37.4 %      .3 (H) fL      MCH 33.7 (H) pg      MCHC 32.6 g/dL      RDW 16.4 (H) %      RDW-SD 62.0 (H) fl      MPV 10.0 fL      Platelets 299 10*3/mm3      Neutrophil % 74.4 %      Lymphocyte % 7.9 (L) %      Monocyte % 15.9 (H) %      Eosinophil % 1.3 %      Basophil % 0.1 %      Immature Grans % 0.4 %      Neutrophils, Absolute 9.47 (H) 10*3/mm3      Lymphocytes, Absolute 1.00 10*3/mm3      Monocytes, Absolute 2.02 (H) 10*3/mm3      Eosinophils, Absolute 0.16 10*3/mm3      Basophils, Absolute 0.01 10*3/mm3      Immature Grans, Absolute 0.05 (H) 10*3/mm3     Comprehensive Metabolic Panel [[id number]]  (Abnormal) Collected:  08/14/17 1230    Specimen:  Blood  Updated:  08/14/17 1303     Glucose 103 (H) mg/dL      BUN 6 mg/dL      Creatinine 0.75 mg/dL      Sodium 142 mmol/L      Potassium 3.4 (L) mmol/L      Chloride 102 mmol/L      CO2 26.3 mmol/L      Calcium 9.7 mg/dL      Total Protein 7.9 g/dL      Albumin 4.40 g/dL      ALT (SGPT) 13 U/L      AST (SGOT) 17 U/L      Alkaline Phosphatase 78 U/L      Total Bilirubin 0.3 mg/dL      eGFR  African Amer 114 mL/min/1.73      Globulin 3.5 gm/dL      A/G Ratio 1.3 g/dL      BUN/Creatinine Ratio 8.0     Anion Gap 13.7 mmol/L     hCG, Serum, Qualitative [[id number]]  (Normal) Collected:  08/14/17 1230    Specimen:  Blood Updated:  08/14/17 1301     HCG Qualitative Negative    Urinalysis With / Culture If Indicated [[id number]]  (Abnormal) Collected:  08/14/17 1247    Specimen:  Urine from Urine, Clean Catch Updated:  08/14/17 1324     Color, UA Dark Yellow (A)     Appearance, UA Cloudy (A)     pH, UA 7.5     Specific Gravity, UA 1.022     Glucose, UA Negative     Ketones, UA 15 mg/dL (1+) (A)     Bilirubin, UA Negative     Blood, UA Negative     Protein, UA Trace (A)     Leuk Esterase, UA Large (3+) (A)     Nitrite, UA Negative     Urobilinogen, UA 1.0 E.U./dL    Urine Drug Screen [[id number]]  (Abnormal) Collected:  08/14/17 1247    Specimen:  Urine from Urine, Clean Catch Updated:  08/14/17 1321     Amphet/Methamphet, Screen Negative     Barbiturates Screen, Urine Negative     Benzodiazepine Screen, Urine Positive (A)     Cocaine Screen, Urine Positive (A)     Opiate Screen Positive (A)     THC, Screen, Urine Positive (A)     Methadone Screen, Urine Negative     Oxycodone Screen, Urine Negative    Narrative:       Negative Thresholds For Drugs Screened:     Amphetamines               500 ng/ml   Barbiturates               200 ng/ml   Benzodiazepines            100 ng/ml   Cocaine                    300 ng/ml   Methadone                  300 ng/ml   Opiates                    300 ng/ml   Oxycodone                  100 ng/ml   THC                         50 ng/ml    The Normal Value for all drugs tested is negative. This report includes final unconfirmed screening results to be used for medical treatment purposes only. Unconfirmed results must not be used for non-medical purposes such as employment or legal testing. Clinical consideration should be applied to any drug of abuse test, particulary when unconfirmed results are used.    Urinalysis, Microscopic Only [[id number]]  (Abnormal) Collected:  08/14/17 1247    Specimen:  Urine from Urine, Clean Catch Updated:  08/14/17 1324     RBC, UA 3-5 (A) /HPF      WBC, UA 21-30 (A) /HPF      Bacteria, UA 1+ (A) /HPF      Squamous Epithelial Cells, UA 0-2 /HPF      Hyaline Casts, UA 7-12 /LPF      Methodology Automated Microscopy    Urine Culture [[id number]] Collected:  08/14/17 1247    Specimen:  Urine from Urine, Clean Catch Updated:  08/14/17 1308          I ordered the above labs and reviewed the results    PROCEDURES  Procedures      PROGRESS AND CONSULTS  ED Course     1208- D/w pt that she needs to set up an appointment with her PMD for her known Behcet's Syndrome. Discussed the plan to order lab work for further evaluation, steroids and pain medication for her pain control. Pt agrees with the plan and all questions were addressed.    1213- Ordered blood work, hCG, UDS and UA for further evaluation. Ordered solu-cortef for oral lesions, Dilaudid and Zofran for pain and IVF for hydration.    1334- Rechecked pt. Pt is resting comfortably. Notified pt of her lab results, including her UTI and UDS results. Discussed the plan to discharge the pt home with prescriptions for pain medication, steroids and magic mouthwash but that I will not prescribe pain medication since the pt has cocaine and narcotics in her system. I instructed the pt to follow up with her PMD and a rheumatologist for continued care of her Behcet's Syndrome. Pt agrees with the plan and all questions were addressed.    1:39 PM  Latest  vital signs   BP- 114/71 HR- 101 Temp- 100.4 °F (38 °C) (Tympanic) O2 sat- 94%    MEDICAL DECISION MAKING  Results were reviewed/discussed with the patient and they were also made aware of online access. Pt also made aware that some labs, such as cultures, will not be resulted during ER visit and follow up with PMD is necessary.     MDM  Number of Diagnoses or Management Options     Amount and/or Complexity of Data Reviewed  Clinical lab tests: ordered and reviewed (UDS is positive for cocaine, THC, benzodiazepine and opiates)  Decide to obtain previous medical records or to obtain history from someone other than the patient: yes  Review and summarize past medical records: yes (Pt has two prior visits w/in the last four months for difficulty swallowing secondary to painful oral lesions.)    Patient Progress  Patient progress: stable         DIAGNOSIS  Final diagnoses:   Unspecified lesions of oral mucosa   Behcet's syndrome   Acute UTI   Other chronic pain       DISPOSITION  DISCHARGE    Patient discharged in stable condition.    Reviewed implications of results, diagnosis, meds, responsibility to follow up, warning signs and symptoms of possible worsening, potential complications and reasons to return to ER, including fever, worsening pain or any concerns.    Patient/Family voiced understanding of above instructions.    Discussed plan for discharge, as there is no emergent indication for admission.  Pt/family is agreeable and understands need for follow up and repeat testing.  Pt is aware that discharge does not mean that nothing is wrong but it indicates no emergency is present that requires admission and they must continue care with follow-up as given below or physician of their choice.     FOLLOW-UP  Bandar Morel MD  [address] [facility] [address]  [place] [address] [phone]    Schedule an appointment as soon as possible for a visit      [facility] RHEUMATOLOGY PROVIDER  [address]  [facility] [address] [phone]  Schedule an appointment as soon as possible for a visit           Medication List      New Prescriptions          sulfamethoxazole-trimethoprim 800-160 MG per tablet   Commonly known as:  BACTRIM DS,SEPTRA DS   Take 1 tablet by mouth 2 (Two) Times a Day.         Stop          doxycycline 100 MG capsule   Commonly known as:  MONODOX       HYDROcodone-acetaminophen 5-325 MG per tablet   Commonly known as:  NORCO       metroNIDAZOLE 500 MG tablet   Commonly known as:  FLAGYL       MONONESSA 0.25-35 MG-MCG per tablet   Generic drug:  norgestimate-ethinyl estradiol       oxyCODONE-acetaminophen 7.5-325 MG per tablet   Commonly known as:  PERCOCET       predniSONE 20 MG tablet   Commonly known as:  DELTASONE               Latest Documented Vital Signs:  As of 4:29 PM  BP- 111/64 HR- 107 Temp- 99.4 °F (37.4 °C) (Tympanic) O2 sat- 98%    --  Documentation assistance provided by sarmad Cazares for Dr. Fuentes.  Information recorded by the sarmad was done at my direction and has been verified and validated by me.       Luci Cazares  08/14/17 0402       Irving Fuentes MD  08/14/17 3447

## 2017-08-16 ENCOUNTER — TELEPHONE (OUTPATIENT)
Dept: SOCIAL WORK | Facility: HOSPITAL | Age: 27
End: 2017-08-16

## 2017-08-16 LAB — BACTERIA SPEC AEROBE CULT: NORMAL

## 2017-12-14 ENCOUNTER — HOSPITAL ENCOUNTER (EMERGENCY)
Facility: HOSPITAL | Age: 27
Discharge: HOME | End: 2017-12-14
Attending: EMERGENCY MEDICINE | Admitting: EMERGENCY MEDICINE

## 2017-12-14 VITALS
TEMPERATURE: 99 F | DIASTOLIC BLOOD PRESSURE: 74 MMHG | BODY MASS INDEX: 25.76 KG/M2 | WEIGHT: 140 LBS | HEIGHT: 62 IN | HEART RATE: 120 BPM | RESPIRATION RATE: 18 BRPM | OXYGEN SATURATION: 100 % | SYSTOLIC BLOOD PRESSURE: 118 MMHG

## 2017-12-14 DIAGNOSIS — K12.0 ORAL APHTHOUS ULCER: Primary | ICD-10-CM

## 2017-12-14 LAB
ALBUMIN SERPL-MCNC: 4 G/DL (ref 3.5–5.2)
ALBUMIN/GLOB SERPL: 1 G/DL
ALP SERPL-CCNC: 81 U/L (ref 39–117)
ALT SERPL W P-5'-P-CCNC: 12 U/L (ref 1–33)
ANION GAP SERPL CALCULATED.3IONS-SCNC: 14.2 MMOL/L
AST SERPL-CCNC: 13 U/L (ref 1–32)
BASOPHILS # BLD AUTO: 0.02 10*3/MM3 (ref 0–0.2)
BASOPHILS NFR BLD AUTO: 0.1 % (ref 0–1.5)
BILIRUB SERPL-MCNC: 0.4 MG/DL (ref 0.1–1.2)
BUN BLD-MCNC: 12 MG/DL (ref 6–20)
BUN/CREAT SERPL: 20 (ref 7–25)
CALCIUM SPEC-SCNC: 9.2 MG/DL (ref 8.6–10.5)
CHLORIDE SERPL-SCNC: 96 MMOL/L (ref 98–107)
CO2 SERPL-SCNC: 26.8 MMOL/L (ref 22–29)
CREAT BLD-MCNC: 0.6 MG/DL (ref 0.57–1)
DEPRECATED RDW RBC AUTO: 64.8 FL (ref 37–54)
EOSINOPHIL # BLD AUTO: 0.23 10*3/MM3 (ref 0–0.7)
EOSINOPHIL NFR BLD AUTO: 1.5 % (ref 0.3–6.2)
ERYTHROCYTE [DISTWIDTH] IN BLOOD BY AUTOMATED COUNT: 16.7 % (ref 11.7–13)
GFR SERPL CREATININE-BSD FRML MDRD: 146 ML/MIN/1.73
GLOBULIN UR ELPH-MCNC: 4.1 GM/DL
GLUCOSE BLD-MCNC: 90 MG/DL (ref 65–99)
HCT VFR BLD AUTO: 39.1 % (ref 35.6–45.5)
HGB BLD-MCNC: 12.5 G/DL (ref 11.9–15.5)
IMM GRANULOCYTES # BLD: 0.06 10*3/MM3 (ref 0–0.03)
IMM GRANULOCYTES NFR BLD: 0.4 % (ref 0–0.5)
LYMPHOCYTES # BLD AUTO: 2.8 10*3/MM3 (ref 0.9–4.8)
LYMPHOCYTES NFR BLD AUTO: 18.4 % (ref 19.6–45.3)
MCH RBC QN AUTO: 33.3 PG (ref 26.9–32)
MCHC RBC AUTO-ENTMCNC: 32 G/DL (ref 32.4–36.3)
MCV RBC AUTO: 104.3 FL (ref 80.5–98.2)
MONOCYTES # BLD AUTO: 2.84 10*3/MM3 (ref 0.2–1.2)
MONOCYTES NFR BLD AUTO: 18.7 % (ref 5–12)
NEUTROPHILS # BLD AUTO: 9.23 10*3/MM3 (ref 1.9–8.1)
NEUTROPHILS NFR BLD AUTO: 60.9 % (ref 42.7–76)
PLATELET # BLD AUTO: 281 10*3/MM3 (ref 140–500)
PMV BLD AUTO: 9.4 FL (ref 6–12)
POTASSIUM BLD-SCNC: 3.5 MMOL/L (ref 3.5–5.2)
PROT SERPL-MCNC: 8.1 G/DL (ref 6–8.5)
RBC # BLD AUTO: 3.75 10*6/MM3 (ref 3.9–5.2)
SODIUM BLD-SCNC: 137 MMOL/L (ref 136–145)
WBC NRBC COR # BLD: 15.18 10*3/MM3 (ref 4.5–10.7)

## 2017-12-14 PROCEDURE — 96374 THER/PROPH/DIAG INJ IV PUSH: CPT

## 2017-12-14 PROCEDURE — 85025 COMPLETE CBC W/AUTO DIFF WBC: CPT | Performed by: NURSE PRACTITIONER

## 2017-12-14 PROCEDURE — 99284 EMERGENCY DEPT VISIT MOD MDM: CPT

## 2017-12-14 PROCEDURE — 36415 COLL VENOUS BLD VENIPUNCTURE: CPT | Performed by: NURSE PRACTITIONER

## 2017-12-14 PROCEDURE — 25010000002 METHYLPREDNISOLONE PER 125 MG: Performed by: EMERGENCY MEDICINE

## 2017-12-14 PROCEDURE — 80053 COMPREHEN METABOLIC PANEL: CPT | Performed by: NURSE PRACTITIONER

## 2017-12-14 RX ORDER — HYDROCODONE BITARTRATE AND ACETAMINOPHEN 7.5; 325 MG/1; MG/1
1 TABLET ORAL EVERY 6 HOURS PRN
Qty: 15 TABLET | Refills: 0 | Status: SHIPPED | OUTPATIENT
Start: 2017-12-14 | End: 2018-03-29

## 2017-12-14 RX ORDER — DIPHENHYDRAMINE, LIDOCAINE, NYSTATIN
5 KIT ORAL 4 TIMES DAILY
Qty: 180 ML | Refills: 0 | Status: SHIPPED | OUTPATIENT
Start: 2017-12-14 | End: 2018-05-09

## 2017-12-14 RX ORDER — ONDANSETRON 4 MG/1
4 TABLET, FILM COATED ORAL EVERY 8 HOURS PRN
Qty: 15 TABLET | Refills: 0 | Status: SHIPPED | OUTPATIENT
Start: 2017-12-14 | End: 2018-03-08

## 2017-12-14 RX ORDER — OXYCODONE HYDROCHLORIDE AND ACETAMINOPHEN 5; 325 MG/1; MG/1
1 TABLET ORAL ONCE
Status: COMPLETED | OUTPATIENT
Start: 2017-12-14 | End: 2017-12-14

## 2017-12-14 RX ORDER — PREDNISONE 20 MG/1
40 TABLET ORAL DAILY
Qty: 10 TABLET | Refills: 0 | Status: SHIPPED | OUTPATIENT
Start: 2017-12-14 | End: 2018-05-09

## 2017-12-14 RX ORDER — METHYLPREDNISOLONE SODIUM SUCCINATE 125 MG/2ML
125 INJECTION, POWDER, LYOPHILIZED, FOR SOLUTION INTRAMUSCULAR; INTRAVENOUS ONCE
Status: COMPLETED | OUTPATIENT
Start: 2017-12-14 | End: 2017-12-14

## 2017-12-14 RX ORDER — ONDANSETRON 4 MG/1
4 TABLET, ORALLY DISINTEGRATING ORAL ONCE
Status: COMPLETED | OUTPATIENT
Start: 2017-12-14 | End: 2017-12-14

## 2017-12-14 RX ADMIN — ONDANSETRON 4 MG: 4 TABLET, ORALLY DISINTEGRATING ORAL at 18:54

## 2017-12-14 RX ADMIN — OXYCODONE HYDROCHLORIDE AND ACETAMINOPHEN 1 TABLET: 5; 325 TABLET ORAL at 18:53

## 2017-12-14 RX ADMIN — METHYLPREDNISOLONE SODIUM SUCCINATE 125 MG: 125 INJECTION, POWDER, FOR SOLUTION INTRAMUSCULAR; INTRAVENOUS at 18:54

## 2017-12-14 NOTE — ED PROVIDER NOTES
" EMERGENCY DEPARTMENT ENCOUNTER    CHIEF COMPLAINT  Chief Complaint: Oral Swelling / Pain  History given by: Patient  History limited by: Speech difficulty   Room Number: 01/01  PMD: Bandar Morel MD      HPI:  Pt is a 27 y.o. female who presents to the ED complaining of progressively worsening oral pain / swelling as well as oral ulcers which she initially noticed approximately two weeks ago. The patient reports that she has h/o Bichet's although she has been unable to follow up with a Rheumatologist. She explains that she's also developed a white coating to her tongue but she denies any fever, chills, nausea, vomiting, diarrhea or dyspnea since onset. She notes that she received Magic Mouthwash in the past when she had similar symptoms which helped alleviate the thrush located on her tongue. She notes that the current exacerbation is similar to her previous episodes and she has no other complaints at this time.     Duration:  2 weeks  Onset: Gradual  Timing: Constant  Location: Mouth  Radiation: None  Quality: \"Pain\"  Intensity/Severity: Moderate  Progression: Worsened  Associated Symptoms: Oral pain / swelling  Aggravating Factors: Palpation  Alleviating Factors: None  Previous Episodes: Yes, several previous episodes  Treatment before arrival: None mentioned    PAST MEDICAL HISTORY  Active Ambulatory Problems     Diagnosis Date Noted   • Anxiety 01/04/2016   • ADD (attention deficit disorder) 01/04/2016   • Behcet's syndrome 01/04/2016   • Autoimmune disease    • Depression      Resolved Ambulatory Problems     Diagnosis Date Noted   • No Resolved Ambulatory Problems     Past Medical History:   Diagnosis Date   • ADD (attention deficit disorder)    • Anxiety    • Autoimmune disease    • Behcet's disease    • Depression    • Eye exam, routine 2013   • Pap smear, as part of routine gynecological examination 01/2015       PAST SURGICAL HISTORY  Past Surgical History:   Procedure Laterality Date   • BREAST CYST " EXCISION  2010   • DECUBITUS ULCER EXCISION     • TONSILLECTOMY  2004       FAMILY HISTORY  Family History   Problem Relation Age of Onset   • Diabetes Mother        SOCIAL HISTORY  Social History     Social History   • Marital status: Single     Spouse name: N/A   • Number of children: N/A   • Years of education: N/A     Occupational History   • Not on file.     Social History Main Topics   • Smoking status: Former Smoker     Packs/day: 0.50     Years: 5.00     Types: Cigarettes   • Smokeless tobacco: Never Used   • Alcohol use Yes      Comment: occ   • Drug use: No   • Sexual activity: Defer     Other Topics Concern   • Not on file     Social History Narrative   • No narrative on file       ALLERGIES  Review of patient's allergies indicates no known allergies.    REVIEW OF SYSTEMS  Review of Systems   Constitutional: Negative.  Negative for chills and fever.   HENT: Positive for mouth sores. Negative for sore throat.         Oral pain / swelling   Eyes: Negative.    Respiratory: Negative.  Negative for cough.    Cardiovascular: Negative.  Negative for chest pain.   Gastrointestinal: Negative.    Genitourinary: Negative.  Negative for dysuria.   Musculoskeletal: Negative.  Negative for back pain.   Skin: Negative.  Negative for rash.   Neurological: Negative.  Negative for headaches.       PHYSICAL EXAM  ED Triage Vitals   Temp Heart Rate Resp BP SpO2   12/14/17 1227 12/14/17 1227 12/14/17 1227 12/14/17 1254 12/14/17 1227   99 °F (37.2 °C) 99 16 127/79 98 %      Temp src Heart Rate Source Patient Position BP Location FiO2 (%)   12/14/17 1227 12/14/17 1227 -- -- --   Tympanic Monitor          Physical Exam   Constitutional: She is oriented to person, place, and time and well-developed, well-nourished, and in no distress. No distress.   HENT:   Head: Normocephalic and atraumatic.   Multiple aphthous ulcers to the buccal mucosa. White coating of thrush on the tongue.    Neck: Normal range of motion.   Cardiovascular:  Normal rate and regular rhythm.    Pulmonary/Chest: Effort normal and breath sounds normal. No respiratory distress.   Neurological: She is alert and oriented to person, place, and time. She has normal sensation and normal strength.   Skin: Skin is warm and dry.   Psychiatric: Affect normal.   Nursing note and vitals reviewed.      LAB RESULTS  Lab Results (last 24 hours)     Procedure Component Value Units Date/Time    CBC & Differential [[id number]] Collected:  12/14/17 1421    Specimen:  Blood Updated:  12/14/17 1448    Narrative:       The following orders were created for panel order CBC & Differential.  Procedure                               Abnormality         Status                     ---------                               -----------         ------                     Scan Slide[[id number]]                                                                  CBC Auto Differential[[id number]]        Abnormal            Final result                 Please view results for these tests on the individual orders.    Comprehensive Metabolic Panel [[id number]]  (Abnormal) Collected:  12/14/17 1421    Specimen:  Blood Updated:  12/14/17 1459     Glucose 90 mg/dL      BUN 12 mg/dL      Creatinine 0.60 mg/dL      Sodium 137 mmol/L      Potassium 3.5 mmol/L      Chloride 96 (L) mmol/L      CO2 26.8 mmol/L      Calcium 9.2 mg/dL      Total Protein 8.1 g/dL      Albumin 4.00 g/dL      ALT (SGPT) 12 U/L      AST (SGOT) 13 U/L      Alkaline Phosphatase 81 U/L      Total Bilirubin 0.4 mg/dL      eGFR  African Amer 146 mL/min/1.73      Globulin 4.1 gm/dL      A/G Ratio 1.0 g/dL      BUN/Creatinine Ratio 20.0     Anion Gap 14.2 mmol/L     CBC Auto Differential [[id number]]  (Abnormal) Collected:  12/14/17 1421    Specimen:  Blood Updated:  12/14/17 1448     WBC 15.18 (H) 10*3/mm3      RBC 3.75 (L) 10*6/mm3      Hemoglobin 12.5 g/dL      Hematocrit 39.1 %      .3 (H) fL      MCH 33.3 (H) pg      MCHC 32.0 (L) g/dL      RDW  16.7 (H) %      RDW-SD 64.8 (H) fl      MPV 9.4 fL      Platelets 281 10*3/mm3      Neutrophil % 60.9 %      Lymphocyte % 18.4 (L) %      Monocyte % 18.7 (H) %      Eosinophil % 1.5 %      Basophil % 0.1 %      Immature Grans % 0.4 %      Neutrophils, Absolute 9.23 (H) 10*3/mm3      Lymphocytes, Absolute 2.80 10*3/mm3      Monocytes, Absolute 2.84 (H) 10*3/mm3      Eosinophils, Absolute 0.23 10*3/mm3      Basophils, Absolute 0.02 10*3/mm3      Immature Grans, Absolute 0.06 (H) 10*3/mm3           I ordered the above labs and reviewed the results    PROCEDURES  Procedures      PROGRESS AND CONSULTS  ED Course     1325  Ordered Labs for further evaluation.     1848  Rechecked the patient and she is resting. Discussed the patient's pertinent labs and imaging results, including her WBC . Discussed plan to discharge the patient home and recommended follow up with  (PCP). Patient agrees with the plan and all questions were addressed.     Latest vital signs   BP- 131/78 HR- (!) 122 Temp- 99 °F (37.2 °C) (Tympanic) O2 sat- 100%    1853  Discussed case with  and he agrees with the treatment plan.       MEDICAL DECISION MAKING  Results were reviewed/discussed with the patient and they were also made aware of online access. Pt also made aware that some labs, such as cultures, will not be resulted during ER visit and follow up with PMD is necessary.     MDM  Number of Diagnoses or Management Options     Amount and/or Complexity of Data Reviewed  Clinical lab tests: ordered and reviewed (WBC 15.18)  Decide to obtain previous medical records or to obtain history from someone other than the patient: yes  Review and summarize past medical records: yes (Previously seen in the ED 6/29/17 for Behcet's disease. Also seen in the ED 8/24/16 for similar symptoms. )    Patient Progress  Patient progress: stable         DIAGNOSIS  Final diagnoses:   Oral aphthous ulcer       DISPOSITION  DISCHARGE    Patient discharged in  stable condition.    Reviewed implications of results, diagnosis, meds, responsibility to follow up, warning signs and symptoms of possible worsening, potential complications and reasons to return to ER, including worsening pain.     Patient/Family voiced understanding of above instructions.    Discussed plan for discharge, as there is no emergent indication for admission.  Pt/family is agreeable and understands need for follow up and repeat testing.  Pt is aware that discharge does not mean that nothing is wrong but it indicates no emergency is present that requires admission and they must continue care with follow-up as given below or physician of their choice.     FOLLOW-UP  Bandar Morel MD  [address] [id number] [phone]    Schedule an appointment as soon as possible for a visit  for recheck and referral to rheumatology         Medication List      New Prescriptions          HYDROcodone-acetaminophen 7.5-325 MG per tablet   Commonly known as:  NORCO   Take 1 tablet by mouth Every 6 (Six) Hours As Needed for Severe Pain .   Replaces:  HYDROcodone-acetaminophen 5-325 MG per tablet       nystatin susp + lidocaine viscous oral suspension   Commonly known as:  MAGIC MOUTHWASH   Swish and swallow 5 mL 4 (Four) Times a Day.       ondansetron 4 MG tablet   Commonly known as:  ZOFRAN   Take 1 tablet by mouth Every 8 (Eight) Hours As Needed for Nausea or   Vomiting.         Changed          predniSONE 20 MG tablet   Commonly known as:  DELTASONE   Take 2 tablets by mouth Daily.   What changed:    - how much to take  - how to take this  - when to take this  - additional instructions  - Another medication with the same name was removed. Continue taking this   medication, and follow the directions you see here.         Stop          ALPRAZolam 1 MG tablet   Commonly known as:  XANAX       diphenhydrAMINE 12.5 MG/5ML elixir 20 mL, aluminum-magnesium   hydroxide-simethicone 400-400-40  MG/5ML suspension 20 mL, lidocaine   viscous 2 % solution 15 mL       doxycycline 100 MG capsule   Commonly known as:  MONODOX       HYDROcodone-acetaminophen 5-325 MG per tablet   Commonly known as:  NORCO   Replaced by:  HYDROcodone-acetaminophen 7.5-325 MG per tablet       MethylPREDNISolone 4 MG tablet   Commonly known as:  MEDROL (DOLLY)       metroNIDAZOLE 500 MG tablet   Commonly known as:  FLAGYL       MONONESSA 0.25-35 MG-MCG per tablet   Generic drug:  norgestimate-ethinyl estradiol       NON FORMULARY       oxyCODONE-acetaminophen 7.5-325 MG per tablet   Commonly known as:  PERCOCET       sulfamethoxazole-trimethoprim 800-160 MG per tablet   Commonly known as:  BACTRIM DS,SEPTRA DS             Latest Documented Vital Signs:  As of 6:51 PM  BP- 131/78 HR- (!) 122 Temp- 99 °F (37.2 °C) (Tympanic) O2 sat- 100%    --  Documentation assistance provided by sarmad Langford for Mike Ruelas PA-C.  Information recorded by the scribe was done at my direction and has been verified and validated by me.     Lewis Langford  12/14/17 5733       PACHECO Carter  12/14/17 2110

## 2017-12-15 NOTE — ED PROVIDER NOTES
I supervised care provided by the midlevel provider.    We have discussed this patient's history, physical exam, and treatment plan.   I have reviewed the note and personally saw and examined the patient and agree with the plan of care.      Pt with h/o Behcet's disease presents to the ED c/o oral swelling and pain onset about 2 weeks ago. Pt denies dyspnea, chest tightness, and difficulty swallowing. On physical exam, there are multiple intraoral lesions present. There is exudate present on the tongue. Pt will be prescribed with steroids and magic moutwash. Pt will be referred to her PMD and rheumatologist for close f/u.                Documentation assistance provided by Debbie Reynolds. Information recorded by the scribe was done at my direction and has been verified and validated by me.     Entered by Debbie Reynolds, acting as scribe for Dr. Nestor MD.         Debbie Reynolds  12/14/17 1914       Az Baez MD  12/14/17 2002

## 2018-03-08 ENCOUNTER — HOSPITAL ENCOUNTER (EMERGENCY)
Facility: HOSPITAL | Age: 28
Discharge: HOME | End: 2018-03-08
Attending: EMERGENCY MEDICINE | Admitting: EMERGENCY MEDICINE

## 2018-03-08 VITALS
WEIGHT: 120 LBS | HEART RATE: 75 BPM | TEMPERATURE: 97 F | OXYGEN SATURATION: 98 % | DIASTOLIC BLOOD PRESSURE: 77 MMHG | SYSTOLIC BLOOD PRESSURE: 114 MMHG | HEIGHT: 64 IN | BODY MASS INDEX: 20.49 KG/M2 | RESPIRATION RATE: 20 BRPM

## 2018-03-08 DIAGNOSIS — M35.2 BEHCET'S SYNDROME (HCC): ICD-10-CM

## 2018-03-08 DIAGNOSIS — M35.2 BEHCET'S SYNDROME INVOLVING ORAL MUCOSA (HCC): Primary | ICD-10-CM

## 2018-03-08 PROCEDURE — 25010000002 METHYLPREDNISOLONE PER 125 MG: Performed by: EMERGENCY MEDICINE

## 2018-03-08 PROCEDURE — 99283 EMERGENCY DEPT VISIT LOW MDM: CPT

## 2018-03-08 PROCEDURE — 96374 THER/PROPH/DIAG INJ IV PUSH: CPT

## 2018-03-08 RX ORDER — SODIUM CHLORIDE 0.9 % (FLUSH) 0.9 %
10 SYRINGE (ML) INJECTION AS NEEDED
Status: DISCONTINUED | OUTPATIENT
Start: 2018-03-08 | End: 2018-03-08 | Stop reason: HOSPADM

## 2018-03-08 RX ORDER — METHYLPREDNISOLONE 4 MG/1
TABLET ORAL
Qty: 21 TABLET | Refills: 0 | Status: SHIPPED | OUTPATIENT
Start: 2018-03-08 | End: 2018-03-29

## 2018-03-08 RX ORDER — ONDANSETRON 4 MG/1
4 TABLET, FILM COATED ORAL EVERY 8 HOURS PRN
Qty: 15 TABLET | Refills: 0 | Status: SHIPPED | OUTPATIENT
Start: 2018-03-08 | End: 2018-05-09

## 2018-03-08 RX ORDER — METHYLPREDNISOLONE SODIUM SUCCINATE 125 MG/2ML
125 INJECTION, POWDER, LYOPHILIZED, FOR SOLUTION INTRAMUSCULAR; INTRAVENOUS ONCE
Status: COMPLETED | OUTPATIENT
Start: 2018-03-08 | End: 2018-03-08

## 2018-03-08 RX ADMIN — LIDOCAINE HYDROCHLORIDE 5 ML: 20 SOLUTION ORAL; TOPICAL at 12:49

## 2018-03-08 RX ADMIN — METHYLPREDNISOLONE SODIUM SUCCINATE 125 MG: 125 INJECTION, POWDER, FOR SOLUTION INTRAMUSCULAR; INTRAVENOUS at 12:29

## 2018-03-08 RX ADMIN — SODIUM CHLORIDE 1000 ML: 9 INJECTION, SOLUTION INTRAVENOUS at 12:28

## 2018-03-08 NOTE — ED NOTES
Pt stated after the viscous lidocaine her mouth felt much better.      Miko Garcia RN  03/08/18 [id number]

## 2018-03-08 NOTE — DISCHARGE INSTRUCTIONS
Behcet Syndrome, Adult  Behcet syndrome is a long-term (chronic) condition that causes swelling and irritation (inflammation) of blood vessels (vasculitis). This inflammation can lead to painful mouth ulcers (canker sores) and skin ulcers. Canker sores may appear anywhere in the mouth. Behcet syndrome may also affect other parts of the body, including the eyes, joints, and genitals.  Behcet syndrome may be passed down to family members through an abnormal gene. This gene may cause your immune system to mistakenly attack blood vessels, causing inflammation (autoimmune disorder). In other cases, the disorder may occur without a family history.  What are the causes?  The exact cause of this condition is not known.  What increases the risk?  This condition is more likely to occur in people who:  · Have a parent who carries the gene that is associated with the condition. The gene is more common in people of  and  descent.  · Are between 20 and 40 years old.  · Are male.  What are the signs or symptoms?  Symptoms of this condition may come and go and can range from mild to severe. Common symptoms include:  · Canker sores.  · Genital ulcers.  · Skin ulcers.  · Painful skin bumps or acne.  · Eye inflammation, which can cause eye pain, redness, tearing, and blurred vision.  · Joint pain and inflammation (arthritis).  Less common symptoms include:  · Digestive system inflammation, which can cause nausea, belly pain, diarrhea, or bloody diarrhea.  · Brain inflammation, which can cause headaches and clumsiness.  · Large blood vessel inflammation, which can cause blood clots.  · Lung or heart inflammation, which can cause breathing difficulty or chest pain.  How is this diagnosed?  This condition is diagnosed based on your symptoms, medical history, and a physical exam. Your health care provider may also do a skin test to confirm the diagnosis. This involves pricking the skin with a needle to see if small  red bumps form (pathergy test).  How is this treated?  There is no cure for this condition. Treatment depends on your symptoms. Treatment may include steroid medicines, such as:  · Steroid creams or ointments for oral, genital, or other skin ulcers.  · Numbing steroid mouthwash for painful mouth ulcers.  · Steroid eye drops for eye symptoms.  · Oral steroid medicine to treat a flare-up of symptoms.  Other medicines used to treat symptoms of this condition may include:  · NSAIDs to relieve pain and swelling.  · Medicines that suppress the immune system response for severe symptoms (immunosuppressive medicines).  · A medicine for severe symptoms that do not respond to other treatments (TNF inhibitor).  Follow these instructions at home:  · Learn as much as you can about your condition.  · Work closely with your team of health care providers.  · Take or apply over-the-counter and prescription medicines only as told by your health care providers.  · Rest at home during a flare-up of symptoms.  · Return to your normal activities as told by your health care providers. Ask your health care providers what activities are safe.  · Keep all follow-up visits as told by your health care provider. This is important.  Where to find more information:  · American Behcet's Disease Association: www.behcets.com  Contact a health care provider if:  · You have a fever.  · You have a flare-up of Behcet symptoms.  · You have side effects from medicines.  · You develop any new symptoms of Behcet syndrome.  Get help right away if:  · You have a severe headache.  · You become unusually clumsy.  · You have a sudden change in vision.  · You have chest pain or difficulty breathing.  · You vomit blood or have bloody stool.  Summary  · Behcet syndrome is a long-term (chronic) condition that causes swelling and irritation (inflammation) of blood vessels (vasculitis).  · Treatment may include steroid medicines and other medicines to relieve pain and  swelling.  · During symptom flare-ups, rest at home and then return to your normal activities as told by your health care provider.  This information is not intended to replace advice given to you by your health care provider. Make sure you discuss any questions you have with your health care provider.  Document Released: 12/08/2003 Document Revised: 12/06/2017 Document Reviewed: 12/06/2017  [employer] Patient Education © 2017 [employer]  Follow up with a rheumatologist ASAP.  I have given you the numbers for every rheumatology group in [place] - when you are better able to talk, start trying to arrange follow up with whomever can see you first

## 2018-03-08 NOTE — ED NOTES
Per EMS and pt, states she is unable to speak due to the lesions and swelling in her mouth.   Pt has hx of bechets.   Pt has multiple lesions on the inside of her mouth and states it is painful. States she has the same issues going on in her genital area.    Pt is able to communicate with a notepad and paper.   Pt states that this has been going on for 1 week.        Miko Garcia RN  03/08/18 0223

## 2019-02-16 ENCOUNTER — HOSPITAL ENCOUNTER (EMERGENCY)
Facility: HOSPITAL | Age: 29
Discharge: HOME | End: 2019-02-16
Attending: EMERGENCY MEDICINE | Admitting: EMERGENCY MEDICINE

## 2019-02-16 VITALS
HEIGHT: 62 IN | SYSTOLIC BLOOD PRESSURE: 96 MMHG | HEART RATE: 93 BPM | DIASTOLIC BLOOD PRESSURE: 62 MMHG | RESPIRATION RATE: 16 BRPM | OXYGEN SATURATION: 99 % | WEIGHT: 147.5 LBS | BODY MASS INDEX: 27.14 KG/M2 | TEMPERATURE: 97.6 F

## 2019-02-16 DIAGNOSIS — F19.10 POLYSUBSTANCE ABUSE (HCC): Primary | ICD-10-CM

## 2019-02-16 DIAGNOSIS — T50.901A ACCIDENTAL OVERDOSE, INITIAL ENCOUNTER: ICD-10-CM

## 2019-02-16 LAB
AMPHET+METHAMPHET UR QL: NEGATIVE
BARBITURATES UR QL SCN: NEGATIVE
BENZODIAZ UR QL SCN: POSITIVE
CANNABINOIDS SERPL QL: POSITIVE
COCAINE UR QL: NEGATIVE
ETHANOL BLD-MCNC: <10 MG/DL (ref 0–10)
ETHANOL UR QL: <0.01 %
METHADONE UR QL SCN: NEGATIVE
OPIATES UR QL: POSITIVE
OXYCODONE UR QL SCN: NEGATIVE

## 2019-02-16 PROCEDURE — 80307 DRUG TEST PRSMV CHEM ANLYZR: CPT | Performed by: PHYSICIAN ASSISTANT

## 2019-02-16 PROCEDURE — 36415 COLL VENOUS BLD VENIPUNCTURE: CPT

## 2019-02-16 PROCEDURE — 99284 EMERGENCY DEPT VISIT MOD MDM: CPT
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
## 2019-05-15 ENCOUNTER — APPOINTMENT (OUTPATIENT)
Dept: GENERAL RADIOLOGY | Facility: HOSPITAL | Age: 29
End: 2019-05-15

## 2019-05-15 ENCOUNTER — ANESTHESIA (OUTPATIENT)
Dept: PERIOP | Facility: HOSPITAL | Age: 29
End: 2019-05-15

## 2019-05-15 ENCOUNTER — ANESTHESIA EVENT (OUTPATIENT)
Dept: PERIOP | Facility: HOSPITAL | Age: 29
End: 2019-05-15

## 2019-05-15 ENCOUNTER — HOSPITAL ENCOUNTER (INPATIENT)
Facility: HOSPITAL | Age: 29
LOS: 2 days | Discharge: HOME | End: 2019-05-17
Attending: EMERGENCY MEDICINE | Admitting: HOSPITALIST

## 2019-05-15 DIAGNOSIS — Z79.899 POLYPHARMACY: ICD-10-CM

## 2019-05-15 DIAGNOSIS — K12.1 STOMATITIS: ICD-10-CM

## 2019-05-15 DIAGNOSIS — A41.9 SEPSIS, DUE TO UNSPECIFIED ORGANISM: ICD-10-CM

## 2019-05-15 DIAGNOSIS — M35.2 BEHCET'S DISEASE (HCC): ICD-10-CM

## 2019-05-15 DIAGNOSIS — L02.31 ABSCESS OF RIGHT BUTTOCK: ICD-10-CM

## 2019-05-15 DIAGNOSIS — L02.31 ABSCESS OF BUTTOCK, RIGHT: Primary | ICD-10-CM

## 2019-05-15 DIAGNOSIS — E87.6 HYPOKALEMIA: ICD-10-CM

## 2019-05-15 DIAGNOSIS — K61.1 PERI-RECTAL ABSCESS: ICD-10-CM

## 2019-05-15 LAB
ALBUMIN SERPL-MCNC: 3.9 G/DL (ref 3.5–5.2)
ALBUMIN/GLOB SERPL: 1 G/DL
ALP SERPL-CCNC: 75 U/L (ref 39–117)
ALT SERPL W P-5'-P-CCNC: 10 U/L (ref 1–33)
AMPHET+METHAMPHET UR QL: NEGATIVE
ANION GAP SERPL CALCULATED.3IONS-SCNC: 13.2 MMOL/L
AST SERPL-CCNC: 12 U/L (ref 1–32)
BACTERIA UR QL AUTO: ABNORMAL /HPF
BARBITURATES UR QL SCN: NEGATIVE
BASOPHILS # BLD AUTO: 0.07 10*3/MM3 (ref 0–0.2)
BASOPHILS NFR BLD AUTO: 0.3 % (ref 0–1.5)
BENZODIAZ UR QL SCN: NEGATIVE
BILIRUB SERPL-MCNC: 0.8 MG/DL (ref 0.2–1.2)
BILIRUB UR QL STRIP: NEGATIVE
BUN BLD-MCNC: 10 MG/DL (ref 6–20)
BUN/CREAT SERPL: 13.9 (ref 7–25)
CALCIUM SPEC-SCNC: 8.9 MG/DL (ref 8.6–10.5)
CANNABINOIDS SERPL QL: POSITIVE
CHLORIDE SERPL-SCNC: 95 MMOL/L (ref 98–107)
CLARITY UR: CLEAR
CO2 SERPL-SCNC: 24.8 MMOL/L (ref 22–29)
COCAINE UR QL: NEGATIVE
COLOR UR: YELLOW
CREAT BLD-MCNC: 0.72 MG/DL (ref 0.57–1)
D-LACTATE SERPL-SCNC: 1 MMOL/L (ref 0.5–2)
DEPRECATED RDW RBC AUTO: 68.1 FL (ref 37–54)
EOSINOPHIL # BLD AUTO: 0.01 10*3/MM3 (ref 0–0.4)
EOSINOPHIL NFR BLD AUTO: 0 % (ref 0.3–6.2)
ERYTHROCYTE [DISTWIDTH] IN BLOOD BY AUTOMATED COUNT: 18 % (ref 12.3–15.4)
GFR SERPL CREATININE-BSD FRML MDRD: 118 ML/MIN/1.73
GLOBULIN UR ELPH-MCNC: 3.8 GM/DL
GLUCOSE BLD-MCNC: 121 MG/DL (ref 65–99)
GLUCOSE UR STRIP-MCNC: NEGATIVE MG/DL
HCG SERPL QL: NEGATIVE
HCT VFR BLD AUTO: 36 % (ref 34–46.6)
HGB BLD-MCNC: 11.5 G/DL (ref 12–15.9)
HGB UR QL STRIP.AUTO: ABNORMAL
HOLD SPECIMEN: NORMAL
HOLD SPECIMEN: NORMAL
HYALINE CASTS UR QL AUTO: ABNORMAL /LPF
IMM GRANULOCYTES # BLD AUTO: 0.45 10*3/MM3 (ref 0–0.05)
IMM GRANULOCYTES NFR BLD AUTO: 1.7 % (ref 0–0.5)
INR PPP: 1.31 (ref 0.9–1.1)
KETONES UR QL STRIP: ABNORMAL
LEUKOCYTE ESTERASE UR QL STRIP.AUTO: ABNORMAL
LYMPHOCYTES # BLD AUTO: 1.77 10*3/MM3 (ref 0.7–3.1)
LYMPHOCYTES NFR BLD AUTO: 6.6 % (ref 19.6–45.3)
MAGNESIUM SERPL-MCNC: 1.7 MG/DL (ref 1.6–2.6)
MCH RBC QN AUTO: 32.8 PG (ref 26.6–33)
MCHC RBC AUTO-ENTMCNC: 31.9 G/DL (ref 31.5–35.7)
MCV RBC AUTO: 102.6 FL (ref 79–97)
METHADONE UR QL SCN: NEGATIVE
MONOCYTES # BLD AUTO: 3.34 10*3/MM3 (ref 0.1–0.9)
MONOCYTES NFR BLD AUTO: 12.4 % (ref 5–12)
NEUTROPHILS # BLD AUTO: 21.2 10*3/MM3 (ref 1.7–7)
NEUTROPHILS NFR BLD AUTO: 79 % (ref 42.7–76)
NITRITE UR QL STRIP: NEGATIVE
NRBC BLD AUTO-RTO: 0 /100 WBC (ref 0–0.2)
OPIATES UR QL: POSITIVE
OXYCODONE UR QL SCN: NEGATIVE
PH UR STRIP.AUTO: 6.5 [PH] (ref 5–8)
PLATELET # BLD AUTO: 243 10*3/MM3 (ref 140–450)
PMV BLD AUTO: 10.1 FL (ref 6–12)
POTASSIUM BLD-SCNC: 3.2 MMOL/L (ref 3.5–5.2)
PROCALCITONIN SERPL-MCNC: 0.45 NG/ML (ref 0.1–0.25)
PROT SERPL-MCNC: 7.7 G/DL (ref 6–8.5)
PROT UR QL STRIP: NEGATIVE
PROTHROMBIN TIME: 16 SECONDS (ref 11.7–14.2)
RBC # BLD AUTO: 3.51 10*6/MM3 (ref 3.77–5.28)
RBC # UR: ABNORMAL /HPF
REF LAB TEST METHOD: ABNORMAL
SODIUM BLD-SCNC: 133 MMOL/L (ref 136–145)
SP GR UR STRIP: 1.01 (ref 1–1.03)
SQUAMOUS #/AREA URNS HPF: ABNORMAL /HPF
UROBILINOGEN UR QL STRIP: ABNORMAL
WBC NRBC COR # BLD: 26.84 10*3/MM3 (ref 3.4–10.8)
WBC UR QL AUTO: ABNORMAL /HPF
WHOLE BLOOD HOLD SPECIMEN: NORMAL

## 2019-05-15 PROCEDURE — 83735 ASSAY OF MAGNESIUM: CPT | Performed by: EMERGENCY MEDICINE

## 2019-05-15 PROCEDURE — 80307 DRUG TEST PRSMV CHEM ANLYZR: CPT | Performed by: EMERGENCY MEDICINE

## 2019-05-15 PROCEDURE — 87147 CULTURE TYPE IMMUNOLOGIC: CPT | Performed by: SURGERY

## 2019-05-15 PROCEDURE — 99285 EMERGENCY DEPT VISIT HI MDM: CPT

## 2019-05-15 PROCEDURE — 83605 ASSAY OF LACTIC ACID: CPT | Performed by: EMERGENCY MEDICINE

## 2019-05-15 PROCEDURE — 25010000002 VANCOMYCIN 10 G RECONSTITUTED SOLUTION: Performed by: EMERGENCY MEDICINE

## 2019-05-15 PROCEDURE — 0H98XZZ DRAINAGE OF BUTTOCK SKIN, EXTERNAL APPROACH: ICD-10-PCS | Performed by: SURGERY

## 2019-05-15 PROCEDURE — 25010000002 FENTANYL CITRATE (PF) 100 MCG/2ML SOLUTION: Performed by: ANESTHESIOLOGY

## 2019-05-15 PROCEDURE — 99253 IP/OBS CNSLTJ NEW/EST LOW 45: CPT | Performed by: SURGERY

## 2019-05-15 PROCEDURE — 25010000003 POTASSIUM CHLORIDE 10 MEQ/100ML SOLUTION: Performed by: EMERGENCY MEDICINE

## 2019-05-15 PROCEDURE — 87205 SMEAR GRAM STAIN: CPT | Performed by: EMERGENCY MEDICINE

## 2019-05-15 PROCEDURE — 25010000002 METOCLOPRAMIDE PER 10 MG: Performed by: ANESTHESIOLOGY

## 2019-05-15 PROCEDURE — 80053 COMPREHEN METABOLIC PANEL: CPT | Performed by: EMERGENCY MEDICINE

## 2019-05-15 PROCEDURE — 84703 CHORIONIC GONADOTROPIN ASSAY: CPT | Performed by: EMERGENCY MEDICINE

## 2019-05-15 PROCEDURE — 87070 CULTURE OTHR SPECIMN AEROBIC: CPT | Performed by: EMERGENCY MEDICINE

## 2019-05-15 PROCEDURE — 25010000002 KETOROLAC TROMETHAMINE PER 15 MG: Performed by: ANESTHESIOLOGY

## 2019-05-15 PROCEDURE — 81001 URINALYSIS AUTO W/SCOPE: CPT | Performed by: EMERGENCY MEDICINE

## 2019-05-15 PROCEDURE — 85610 PROTHROMBIN TIME: CPT | Performed by: EMERGENCY MEDICINE

## 2019-05-15 PROCEDURE — 87070 CULTURE OTHR SPECIMN AEROBIC: CPT | Performed by: SURGERY

## 2019-05-15 PROCEDURE — 87040 BLOOD CULTURE FOR BACTERIA: CPT | Performed by: EMERGENCY MEDICINE

## 2019-05-15 PROCEDURE — 87147 CULTURE TYPE IMMUNOLOGIC: CPT | Performed by: EMERGENCY MEDICINE

## 2019-05-15 PROCEDURE — 87205 SMEAR GRAM STAIN: CPT | Performed by: SURGERY

## 2019-05-15 PROCEDURE — 71046 X-RAY EXAM CHEST 2 VIEWS: CPT

## 2019-05-15 PROCEDURE — 10060 I&D ABSCESS SIMPLE/SINGLE: CPT | Performed by: SURGERY

## 2019-05-15 PROCEDURE — 85025 COMPLETE CBC W/AUTO DIFF WBC: CPT | Performed by: EMERGENCY MEDICINE

## 2019-05-15 PROCEDURE — 25010000002 MIDAZOLAM PER 1 MG: Performed by: ANESTHESIOLOGY

## 2019-05-15 PROCEDURE — 25010000002 PIPERACILLIN SOD-TAZOBACTAM PER 1 G: Performed by: EMERGENCY MEDICINE

## 2019-05-15 PROCEDURE — 25010000002 PROPOFOL 10 MG/ML EMULSION: Performed by: ANESTHESIOLOGY

## 2019-05-15 PROCEDURE — 25010000002 FENTANYL CITRATE (PF) 100 MCG/2ML SOLUTION: Performed by: NURSE ANESTHETIST, CERTIFIED REGISTERED

## 2019-05-15 PROCEDURE — 25010000002 PIPERACILLIN SOD-TAZOBACTAM PER 1 G: Performed by: SURGERY

## 2019-05-15 PROCEDURE — 84145 PROCALCITONIN (PCT): CPT | Performed by: EMERGENCY MEDICINE

## 2019-05-15 PROCEDURE — 25010000002 ONDANSETRON PER 1 MG: Performed by: ANESTHESIOLOGY

## 2019-05-15 RX ORDER — SODIUM CHLORIDE 9 MG/ML
75 INJECTION, SOLUTION INTRAVENOUS CONTINUOUS
Status: DISCONTINUED | OUTPATIENT
Start: 2019-05-15 | End: 2019-05-17

## 2019-05-15 RX ORDER — FENTANYL CITRATE 50 UG/ML
50 INJECTION, SOLUTION INTRAMUSCULAR; INTRAVENOUS
Status: DISCONTINUED | OUTPATIENT
Start: 2019-05-15 | End: 2019-05-15 | Stop reason: HOSPADM

## 2019-05-15 RX ORDER — DIPHENHYDRAMINE HYDROCHLORIDE 50 MG/ML
12.5 INJECTION INTRAMUSCULAR; INTRAVENOUS
Status: DISCONTINUED | OUTPATIENT
Start: 2019-05-15 | End: 2019-05-15 | Stop reason: HOSPADM

## 2019-05-15 RX ORDER — FENTANYL CITRATE 50 UG/ML
INJECTION, SOLUTION INTRAMUSCULAR; INTRAVENOUS AS NEEDED
Status: DISCONTINUED | OUTPATIENT
Start: 2019-05-15 | End: 2019-05-15 | Stop reason: SURG

## 2019-05-15 RX ORDER — LIDOCAINE HYDROCHLORIDE 20 MG/ML
INJECTION, SOLUTION INFILTRATION; PERINEURAL AS NEEDED
Status: DISCONTINUED | OUTPATIENT
Start: 2019-05-15 | End: 2019-05-15 | Stop reason: SURG

## 2019-05-15 RX ORDER — HYDROMORPHONE HYDROCHLORIDE 1 MG/ML
0.5 INJECTION, SOLUTION INTRAMUSCULAR; INTRAVENOUS; SUBCUTANEOUS
Status: DISCONTINUED | OUTPATIENT
Start: 2019-05-15 | End: 2019-05-15 | Stop reason: HOSPADM

## 2019-05-15 RX ORDER — PROMETHAZINE HYDROCHLORIDE 25 MG/1
25 TABLET ORAL ONCE AS NEEDED
Status: DISCONTINUED | OUTPATIENT
Start: 2019-05-15 | End: 2019-05-15 | Stop reason: HOSPADM

## 2019-05-15 RX ORDER — OXYCODONE AND ACETAMINOPHEN 7.5; 325 MG/1; MG/1
1 TABLET ORAL ONCE AS NEEDED
Status: DISCONTINUED | OUTPATIENT
Start: 2019-05-15 | End: 2019-05-15 | Stop reason: HOSPADM

## 2019-05-15 RX ORDER — MIDAZOLAM HYDROCHLORIDE 1 MG/ML
2 INJECTION INTRAMUSCULAR; INTRAVENOUS
Status: DISCONTINUED | OUTPATIENT
Start: 2019-05-15 | End: 2019-05-15 | Stop reason: HOSPADM

## 2019-05-15 RX ORDER — KETOROLAC TROMETHAMINE 30 MG/ML
INJECTION, SOLUTION INTRAMUSCULAR; INTRAVENOUS AS NEEDED
Status: DISCONTINUED | OUTPATIENT
Start: 2019-05-15 | End: 2019-05-15 | Stop reason: SURG

## 2019-05-15 RX ORDER — PROMETHAZINE HYDROCHLORIDE 25 MG/ML
12.5 INJECTION, SOLUTION INTRAMUSCULAR; INTRAVENOUS ONCE AS NEEDED
Status: DISCONTINUED | OUTPATIENT
Start: 2019-05-15 | End: 2019-05-15 | Stop reason: HOSPADM

## 2019-05-15 RX ORDER — ONDANSETRON 2 MG/ML
INJECTION INTRAMUSCULAR; INTRAVENOUS AS NEEDED
Status: DISCONTINUED | OUTPATIENT
Start: 2019-05-15 | End: 2019-05-15 | Stop reason: SURG

## 2019-05-15 RX ORDER — FAMOTIDINE 20 MG/1
20 TABLET, FILM COATED ORAL
Status: COMPLETED | OUTPATIENT
Start: 2019-05-15 | End: 2019-05-15

## 2019-05-15 RX ORDER — FAMOTIDINE 10 MG/ML
20 INJECTION, SOLUTION INTRAVENOUS
Status: COMPLETED | OUTPATIENT
Start: 2019-05-15 | End: 2019-05-15

## 2019-05-15 RX ORDER — SODIUM CHLORIDE 0.9 % (FLUSH) 0.9 %
10 SYRINGE (ML) INJECTION AS NEEDED
Status: DISCONTINUED | OUTPATIENT
Start: 2019-05-15 | End: 2019-05-17 | Stop reason: HOSPADM

## 2019-05-15 RX ORDER — HYDROCODONE BITARTRATE AND ACETAMINOPHEN 10; 325 MG/1; MG/1
1 TABLET ORAL EVERY 4 HOURS PRN
Status: DISCONTINUED | OUTPATIENT
Start: 2019-05-15 | End: 2019-05-17 | Stop reason: HOSPADM

## 2019-05-15 RX ORDER — FAMOTIDINE 10 MG/ML
20 INJECTION, SOLUTION INTRAVENOUS ONCE
Status: DISCONTINUED | OUTPATIENT
Start: 2019-05-15 | End: 2019-05-15 | Stop reason: HOSPADM

## 2019-05-15 RX ORDER — NALOXONE HCL 0.4 MG/ML
0.2 VIAL (ML) INJECTION AS NEEDED
Status: DISCONTINUED | OUTPATIENT
Start: 2019-05-15 | End: 2019-05-15 | Stop reason: HOSPADM

## 2019-05-15 RX ORDER — EPHEDRINE SULFATE 50 MG/ML
5 INJECTION, SOLUTION INTRAVENOUS ONCE AS NEEDED
Status: DISCONTINUED | OUTPATIENT
Start: 2019-05-15 | End: 2019-05-15 | Stop reason: HOSPADM

## 2019-05-15 RX ORDER — FLUMAZENIL 0.1 MG/ML
0.2 INJECTION INTRAVENOUS AS NEEDED
Status: DISCONTINUED | OUTPATIENT
Start: 2019-05-15 | End: 2019-05-15 | Stop reason: HOSPADM

## 2019-05-15 RX ORDER — ONDANSETRON 2 MG/ML
4 INJECTION INTRAMUSCULAR; INTRAVENOUS ONCE AS NEEDED
Status: DISCONTINUED | OUTPATIENT
Start: 2019-05-15 | End: 2019-05-15 | Stop reason: HOSPADM

## 2019-05-15 RX ORDER — METOCLOPRAMIDE HYDROCHLORIDE 5 MG/ML
10 INJECTION INTRAMUSCULAR; INTRAVENOUS ONCE
Status: COMPLETED | OUTPATIENT
Start: 2019-05-15 | End: 2019-05-15

## 2019-05-15 RX ORDER — PROPOFOL 10 MG/ML
VIAL (ML) INTRAVENOUS AS NEEDED
Status: DISCONTINUED | OUTPATIENT
Start: 2019-05-15 | End: 2019-05-15 | Stop reason: SURG

## 2019-05-15 RX ORDER — TRISODIUM CITRATE DIHYDRATE AND CITRIC ACID MONOHYDRATE 500; 334 MG/5ML; MG/5ML
30 SOLUTION ORAL ONCE
Status: COMPLETED | OUTPATIENT
Start: 2019-05-15 | End: 2019-05-15

## 2019-05-15 RX ORDER — MIDAZOLAM HYDROCHLORIDE 1 MG/ML
1 INJECTION INTRAMUSCULAR; INTRAVENOUS
Status: DISCONTINUED | OUTPATIENT
Start: 2019-05-15 | End: 2019-05-15 | Stop reason: HOSPADM

## 2019-05-15 RX ORDER — BUPIVACAINE HYDROCHLORIDE AND EPINEPHRINE 5; 5 MG/ML; UG/ML
INJECTION, SOLUTION PERINEURAL AS NEEDED
Status: DISCONTINUED | OUTPATIENT
Start: 2019-05-15 | End: 2019-05-15 | Stop reason: HOSPADM

## 2019-05-15 RX ORDER — PROMETHAZINE HYDROCHLORIDE 25 MG/1
25 SUPPOSITORY RECTAL ONCE AS NEEDED
Status: DISCONTINUED | OUTPATIENT
Start: 2019-05-15 | End: 2019-05-15 | Stop reason: HOSPADM

## 2019-05-15 RX ORDER — HYDROCODONE BITARTRATE AND ACETAMINOPHEN 7.5; 325 MG/1; MG/1
1 TABLET ORAL ONCE AS NEEDED
Status: DISCONTINUED | OUTPATIENT
Start: 2019-05-15 | End: 2019-05-15 | Stop reason: HOSPADM

## 2019-05-15 RX ORDER — HYDRALAZINE HYDROCHLORIDE 20 MG/ML
5 INJECTION INTRAMUSCULAR; INTRAVENOUS
Status: DISCONTINUED | OUTPATIENT
Start: 2019-05-15 | End: 2019-05-15 | Stop reason: HOSPADM

## 2019-05-15 RX ORDER — ACETAMINOPHEN 325 MG/1
650 TABLET ORAL ONCE AS NEEDED
Status: DISCONTINUED | OUTPATIENT
Start: 2019-05-15 | End: 2019-05-15 | Stop reason: HOSPADM

## 2019-05-15 RX ORDER — POTASSIUM CHLORIDE 7.45 MG/ML
10 INJECTION INTRAVENOUS ONCE
Status: COMPLETED | OUTPATIENT
Start: 2019-05-15 | End: 2019-05-15

## 2019-05-15 RX ORDER — LIDOCAINE HYDROCHLORIDE 10 MG/ML
0.5 INJECTION, SOLUTION EPIDURAL; INFILTRATION; INTRACAUDAL; PERINEURAL ONCE AS NEEDED
Status: DISCONTINUED | OUTPATIENT
Start: 2019-05-15 | End: 2019-05-15 | Stop reason: HOSPADM

## 2019-05-15 RX ORDER — HYDROMORPHONE HYDROCHLORIDE 1 MG/ML
0.5 INJECTION, SOLUTION INTRAMUSCULAR; INTRAVENOUS; SUBCUTANEOUS
Status: DISCONTINUED | OUTPATIENT
Start: 2019-05-15 | End: 2019-05-16

## 2019-05-15 RX ORDER — FENTANYL CITRATE 50 UG/ML
INJECTION, SOLUTION INTRAMUSCULAR; INTRAVENOUS
Status: COMPLETED
Start: 2019-05-15 | End: 2019-05-15

## 2019-05-15 RX ORDER — SODIUM CHLORIDE 0.9 % (FLUSH) 0.9 %
1-10 SYRINGE (ML) INJECTION AS NEEDED
Status: DISCONTINUED | OUTPATIENT
Start: 2019-05-15 | End: 2019-05-15 | Stop reason: HOSPADM

## 2019-05-15 RX ORDER — ONDANSETRON 2 MG/ML
4 INJECTION INTRAMUSCULAR; INTRAVENOUS EVERY 6 HOURS PRN
Status: DISCONTINUED | OUTPATIENT
Start: 2019-05-15 | End: 2019-05-17 | Stop reason: HOSPADM

## 2019-05-15 RX ORDER — DIPHENHYDRAMINE HCL 25 MG
25 CAPSULE ORAL
Status: DISCONTINUED | OUTPATIENT
Start: 2019-05-15 | End: 2019-05-15 | Stop reason: HOSPADM

## 2019-05-15 RX ORDER — HYDROCODONE BITARTRATE AND ACETAMINOPHEN 5; 325 MG/1; MG/1
1 TABLET ORAL EVERY 4 HOURS PRN
Status: DISCONTINUED | OUTPATIENT
Start: 2019-05-15 | End: 2019-05-17 | Stop reason: HOSPADM

## 2019-05-15 RX ORDER — PROMETHAZINE HYDROCHLORIDE 25 MG/ML
6.25 INJECTION, SOLUTION INTRAMUSCULAR; INTRAVENOUS
Status: DISCONTINUED | OUTPATIENT
Start: 2019-05-15 | End: 2019-05-15 | Stop reason: HOSPADM

## 2019-05-15 RX ORDER — SODIUM CHLORIDE, SODIUM LACTATE, POTASSIUM CHLORIDE, CALCIUM CHLORIDE 600; 310; 30; 20 MG/100ML; MG/100ML; MG/100ML; MG/100ML
9 INJECTION, SOLUTION INTRAVENOUS CONTINUOUS
Status: DISCONTINUED | OUTPATIENT
Start: 2019-05-15 | End: 2019-05-15

## 2019-05-15 RX ORDER — NALOXONE HCL 0.4 MG/ML
0.1 VIAL (ML) INJECTION
Status: DISCONTINUED | OUTPATIENT
Start: 2019-05-15 | End: 2019-05-16

## 2019-05-15 RX ORDER — LABETALOL HYDROCHLORIDE 5 MG/ML
5 INJECTION, SOLUTION INTRAVENOUS
Status: DISCONTINUED | OUTPATIENT
Start: 2019-05-15 | End: 2019-05-15 | Stop reason: HOSPADM

## 2019-05-15 RX ADMIN — TAZOBACTAM SODIUM AND PIPERACILLIN SODIUM 3.38 G: 375; 3 INJECTION, SOLUTION INTRAVENOUS at 21:56

## 2019-05-15 RX ADMIN — FENTANYL CITRATE 50 MCG: 50 INJECTION INTRAMUSCULAR; INTRAVENOUS at 19:36

## 2019-05-15 RX ADMIN — FAMOTIDINE 20 MG: 10 INJECTION INTRAVENOUS at 19:15

## 2019-05-15 RX ADMIN — SODIUM CITRATE AND CITRIC ACID MONOHYDRATE 30 ML: 500; 334 SOLUTION ORAL at 19:15

## 2019-05-15 RX ADMIN — SODIUM CHLORIDE 100 ML/HR: 9 INJECTION, SOLUTION INTRAVENOUS at 21:56

## 2019-05-15 RX ADMIN — PROPOFOL 200 MG: 10 INJECTION, EMULSION INTRAVENOUS at 19:36

## 2019-05-15 RX ADMIN — TAZOBACTAM SODIUM AND PIPERACILLIN SODIUM 3.38 G: 375; 3 INJECTION, SOLUTION INTRAVENOUS at 16:01

## 2019-05-15 RX ADMIN — MIDAZOLAM 1 MG: 1 INJECTION INTRAMUSCULAR; INTRAVENOUS at 19:16

## 2019-05-15 RX ADMIN — POTASSIUM CHLORIDE 10 MEQ: 7.46 INJECTION, SOLUTION INTRAVENOUS at 18:15

## 2019-05-15 RX ADMIN — FENTANYL CITRATE 50 MCG: 50 INJECTION INTRAMUSCULAR; INTRAVENOUS at 19:44

## 2019-05-15 RX ADMIN — FENTANYL CITRATE 50 MCG: 50 INJECTION, SOLUTION INTRAMUSCULAR; INTRAVENOUS at 20:22

## 2019-05-15 RX ADMIN — LIDOCAINE HYDROCHLORIDE 100 MG: 20 INJECTION, SOLUTION INFILTRATION; PERINEURAL at 19:35

## 2019-05-15 RX ADMIN — SODIUM CHLORIDE 1000 ML: 9 INJECTION, SOLUTION INTRAVENOUS at 15:22

## 2019-05-15 RX ADMIN — ONDANSETRON 4 MG: 2 INJECTION INTRAMUSCULAR; INTRAVENOUS at 19:51

## 2019-05-15 RX ADMIN — FENTANYL CITRATE 50 MCG: 50 INJECTION, SOLUTION INTRAMUSCULAR; INTRAVENOUS at 19:15

## 2019-05-15 RX ADMIN — VANCOMYCIN HYDROCHLORIDE 1250 MG: 10 INJECTION, POWDER, LYOPHILIZED, FOR SOLUTION INTRAVENOUS at 16:28

## 2019-05-15 RX ADMIN — METOCLOPRAMIDE 10 MG: 5 INJECTION, SOLUTION INTRAMUSCULAR; INTRAVENOUS at 19:19

## 2019-05-15 RX ADMIN — KETOROLAC TROMETHAMINE 30 MG: 30 INJECTION, SOLUTION INTRAMUSCULAR; INTRAVENOUS at 19:52

## 2019-05-15 RX ADMIN — SODIUM CHLORIDE, POTASSIUM CHLORIDE, SODIUM LACTATE AND CALCIUM CHLORIDE: 600; 310; 30; 20 INJECTION, SOLUTION INTRAVENOUS at 19:27

## 2019-05-15 RX ADMIN — SODIUM CHLORIDE 1905 ML: 9 INJECTION, SOLUTION INTRAVENOUS at 16:04

## 2019-05-15 NOTE — CONSULTS
REASON FOR CONSULT:    Buttocks abscess    REQUESTING PHYSICIAN:    Clinton Pena M.D.    HISTORY OF PRESENT ILLNESS:    Grisel Allen is a 28 y.o. female who noted the onset of pain in the right buttocks yesterday.  At that time there was a tiny subcutaneous nodule that she could palpate.  She thinks it was maybe half an inch.  Today the pain has increased significantly.  There is an area of firmness about 3 inches.  There is warmth.  There is drainage.  She has noticed fever.     Past Medical History:   Diagnosis Date   • ADD (attention deficit disorder)    • Anxiety    • Autoimmune disease (CMS/HCC)    • Behcet's disease (CMS/HCC)    • Behcet's syndrome (CMS/HCC)    • Depression    • Eye exam, routine 2013   • Heroin abuse (CMS/HCC)    • Pap smear, as part of routine gynecological examination 01/2015    NORMAL       Past Surgical History:   Procedure Laterality Date   • BREAST CYST EXCISION  2010   • DECUBITUS ULCER EXCISION     • TONSILLECTOMY  2004         Current Facility-Administered Medications:   •  [COMPLETED] Insert peripheral IV, , , Once **AND** sodium chloride 0.9 % flush 10 mL, 10 mL, Intravenous, PRN, Clinton Pena MD    Current Outpatient Medications:   •  ALPRAZolam (XANAX PO), Take  by mouth., Disp: , Rfl:   •  Amphetamine-Dextroamphetamine (ADDERALL PO), Take  by mouth., Disp: , Rfl:   •  HYDROcodone-acetaminophen (NORCO) 5-325 MG per tablet, Take 1 tablet by mouth Every 6 (Six) Hours As Needed for Moderate Pain ., Disp: 10 tablet, Rfl: 0  •  predniSONE (DELTASONE) 20 MG tablet, 1 tab BID x 5 days then 1 tab qday x 5 days, Disp: 15 tablet, Rfl: 0    No Known Allergies    Family History   Problem Relation Age of Onset   • Diabetes Mother        Social History     Socioeconomic History   • Marital status: Single     Spouse name: Not on file   • Number of children: Not on file   • Years of education: Not on file   • Highest education level: Not on file   Tobacco Use   • Smoking status: Former  "Smoker     Packs/day: 0.50     Years: 5.00     Pack years: 2.50     Types: Cigarettes   • Smokeless tobacco: Never Used   Substance and Sexual Activity   • Alcohol use: Yes     Comment: occ   • Drug use: No   • Sexual activity: Defer       Review of Systems   Constitutional: Positive for fever.   Respiratory: Negative for shortness of breath.    Gastrointestinal: Negative for blood in stool.   Genitourinary: Negative for dysuria.   Neurological: Negative for syncope.       Objective     BP 99/62   Pulse 105   Temp 99.4 °F (37.4 °C) (Oral)   Resp 16   Ht 157.5 cm (62\")   Wt 63.5 kg (140 lb)   LMP 03/16/2019   SpO2 97%   BMI 25.61 kg/m²     Physical Exam   Constitutional: She is oriented to person, place, and time. She appears well-developed and well-nourished. She appears distressed.   Eyes: Conjunctivae are normal. No scleral icterus.   Cardiovascular: Normal rate, regular rhythm and intact distal pulses.   No murmur heard.  Pulmonary/Chest: Effort normal and breath sounds normal. No respiratory distress. She has no rales.   Abdominal: Soft. Bowel sounds are normal. She exhibits no distension.   Genitourinary:         Musculoskeletal: She exhibits no edema or deformity.   Neurological: She is alert and oriented to person, place, and time.   Skin: Skin is warm. She is diaphoretic.   Psychiatric: She has a normal mood and affect.   Nursing note and vitals reviewed.      DIAGNOSTIC DATA:    WBC 26.84, Hgb 11.5, platelets 243.  CO2 24.8, creatinine 0.72.  ALT 10, AST 12, T bili 0.8, AlkP 75.  PT 16.0, INR 1.31.  Lactate 1.0.  Procalcitonin 0.45.    ASSESSMENT:    Right buttocks abscess   Behcet's disease.  History of substance abuse    PLAN:    She received piperacillin/tazobactam in the emergency department.  I recommended incision and drainage of the abscess.  She wants to proceed.  "

## 2019-05-15 NOTE — ANESTHESIA PROCEDURE NOTES
Airway  Urgency: elective    Date/Time: 5/15/2019 7:37 PM  End Time:5/15/2019 7:37 PM  Airway not difficult    General Information and Staff    Patient location during procedure: OR  Anesthesiologist: Tomás Montano MD    Indications and Patient Condition  Indications for airway management: airway protection    Preoxygenated: yes  Mask difficulty assessment: 1 - vent by mask    Final Airway Details  Final airway type: supraglottic airway      Successful airway: LMA  Size 4    Number of attempts at approach: 1

## 2019-05-15 NOTE — ED NOTES
"Pt reports heroin use via inhalation daily. Last used 2 days ago. Has not gone through withdrawals in the past.    +red/firm skin \"that drains\" to right glut that is hot/painful and has worsened over the last week. +vaginal discharge from \"a cyst\" for 2 weeks    No tylenol/ibuprofen today    Pt denies SI/HI. Sister at bedside. Pt ok w her presence.      Heather Reynaga, RN  05/15/19 0862    "

## 2019-05-15 NOTE — ANESTHESIA PREPROCEDURE EVALUATION
" Anesthesia Evaluation     no history of anesthetic complications:  NPO Solid Status: Waived due to emergency             Airway   Mallampati: III  Dental      Pulmonary - normal exam   (+) a smoker Abstained day of surgery,   (-) asthma, sleep apnea    ROS comment: Negative patient screen for BLAKE    Cardiovascular - negative cardio ROS and normal exam        Neuro/Psych  (+) psychiatric history Anxiety, Depression and ADD,     GI/Hepatic/Renal/Endo      Musculoskeletal     Abdominal    Substance History      OB/GYN          Other                        Anesthesia Plan    ASA 2 - emergent     general   (Ate \"1 bite of salad\" around 130 PM.  Will treat with reglan and bicitra and proceed with surgery.)  Anesthetic plan, all risks, benefits, and alternatives have been provided, discussed and informed consent has been obtained with: patient.      "

## 2019-05-15 NOTE — ED PROVIDER NOTES
EMERGENCY DEPARTMENT ENCOUNTER    CHIEF COMPLAINT  Chief Complaint: R groin abscess  History given by: Pt, friend  History limited by: Nothing  Room Number: [facility]/MAIN OR  PMD: Provider, No Known      HPI:  Pt is a 28 y.o. female who presents complaining of R buttocks abscess that has developed over the last two weeks which has recently begun draining. Pt also c/o fatigue, nausea, and fever starting yesterday. Pt denies vomiting, diarrhea, cough or SOA.  Pt reports she believes she is having a Becht's flair up with ulcerations in her mouth. Pt reports a hx of cannabis as well as heroin use vial nasal ingestion with her last use two days ago, and is concerned about detox.    Duration:  Two weeks  Onset: gradual  Timing: constant  Location: R groin  Radiation: none  Quality: abscess  Intensity/Severity: moderate  Progression: worsening  Associated Symptoms: fatigue, nausea, fever, mouth sores  Aggravating Factors: none  Alleviating Factors: none  Previous Episodes: None stated  Treatment before arrival: Pt did not state any treatment PTA    PAST MEDICAL HISTORY  Active Ambulatory Problems     Diagnosis Date Noted   • Anxiety 01/04/2016   • ADD (attention deficit disorder) 01/04/2016   • Behcet's syndrome (CMS/HCC) 01/04/2016   • Autoimmune disease (CMS/[facility])    • Depression      Resolved Ambulatory Problems     Diagnosis Date Noted   • No Resolved Ambulatory Problems     Past Medical History:   Diagnosis Date   • ADD (attention deficit disorder)    • Anxiety    • Autoimmune disease (CMS/HCC)    • Behcet's disease (CMS/HCC)    • Behcet's syndrome (CMS/HCC)    • Depression    • Eye exam, routine 2013   • Heroin abuse (CMS/[facility])    • Pap smear, as part of routine gynecological examination 01/2015       PAST SURGICAL HISTORY  Past Surgical History:   Procedure Laterality Date   • BREAST CYST EXCISION  2010   • DECUBITUS ULCER EXCISION     • TONSILLECTOMY  2004       FAMILY HISTORY  Family History   Problem Relation  Age of Onset   • Diabetes Mother        SOCIAL HISTORY  Social History     Socioeconomic History   • Marital status: Single     Spouse name: Not on file   • Number of children: Not on file   • Years of education: Not on file   • Highest education level: Not on file   Tobacco Use   • Smoking status: Former Smoker     Packs/day: 0.50     Years: 5.00     Pack years: 2.50     Types: Cigarettes   • Smokeless tobacco: Never Used   Substance and Sexual Activity   • Alcohol use: Yes     Comment: occ   • Drug use: No   • Sexual activity: Defer       ALLERGIES  Patient has no known allergies.    REVIEW OF SYSTEMS  Review of Systems   Constitutional: Positive for fatigue and fever.   HENT: Negative for sore throat.         Pt c/o mouth sores   Eyes: Negative.    Respiratory: Negative for cough and shortness of breath.    Cardiovascular: Negative for chest pain.   Gastrointestinal: Positive for nausea. Negative for abdominal pain, diarrhea and vomiting.   Genitourinary: Negative for dysuria.   Musculoskeletal: Negative for neck pain.   Skin: Negative for rash.        Pt c/o R groin abscess   Neurological: Negative for weakness, numbness and headaches.   Hematological: Negative.    Psychiatric/Behavioral: Negative.    All other systems reviewed and are negative.      PHYSICAL EXAM  ED Triage Vitals [05/15/19 1344]   Temp Heart Rate Resp BP SpO2   (!) 103 °F (39.4 °C) (!) 130 18 98/60 96 %      Temp src Heart Rate Source Patient Position BP Location FiO2 (%)   Tympanic Monitor -- -- --       Physical Exam   Constitutional: She is oriented to person, place, and time. She appears distressed (mild).   Pt is ill-appearing and tired.    HENT:   Head: Normocephalic and atraumatic.   Oral apthious ulcers - Stomatitis on her tongue and oropharynx with a small amount of ulcerations with purulence.    Eyes: EOM are normal. Pupils are equal, round, and reactive to light.   Neck: Normal range of motion. Neck supple.   Cardiovascular: Regular  rhythm and normal heart sounds. Tachycardia present.    to 120   Pulmonary/Chest: Effort normal and breath sounds normal. No respiratory distress.   Abdominal: Soft. There is no tenderness. There is no rebound and no guarding.   Musculoskeletal: Normal range of motion. She exhibits no edema.   There is a R perirectal abscess that is approximately 2 inches from the rectum just right of midline laterally between her rectum and vaginal introitus with surrounding erythtema and induration. It is approximately 13 x 12 cm in area.    Neurological: She is alert and oriented to person, place, and time. She has normal sensation and normal strength. She displays weakness (generalized).   No focal neuro deficits   Skin: Skin is warm and dry. No rash noted.   Psychiatric: Mood and affect normal.   Nursing note and vitals reviewed.      LAB RESULTS  Lab Results (last 24 hours)     Procedure Component Value Units Date/Time    CBC & Differential [[id number]] Collected:  05/15/19 1504    Specimen:  Blood Updated:  05/15/19 1521    Narrative:       The following orders were created for panel order CBC & Differential.  Procedure                               Abnormality         Status                     ---------                               -----------         ------                     CBC Auto Differential[[id number]]        Abnormal            Final result                 Please view results for these tests on the individual orders.    Comprehensive Metabolic Panel [[id number]]  (Abnormal) Collected:  05/15/19 1504    Specimen:  Blood Updated:  05/15/19 1601     Glucose 121 mg/dL      BUN 10 mg/dL      Creatinine 0.72 mg/dL      Sodium 133 mmol/L      Potassium 3.2 mmol/L      Chloride 95 mmol/L      CO2 24.8 mmol/L      Calcium 8.9 mg/dL      Total Protein 7.7 g/dL      Albumin 3.90 g/dL      ALT (SGPT) 10 U/L      AST (SGOT) 12 U/L      Alkaline Phosphatase 75 U/L      Total Bilirubin 0.8 mg/dL      eGFR   Amer 118  "mL/min/1.73      Globulin 3.8 gm/dL      A/G Ratio 1.0 g/dL      BUN/Creatinine Ratio 13.9     Anion Gap 13.2 mmol/L     Narrative:       GFR Normal >60  Chronic Kidney Disease <60  Kidney Failure <15    Magnesium [[id number]]  (Normal) Collected:  05/15/19 1504    Specimen:  Blood Updated:  05/15/19 1601     Magnesium 1.7 mg/dL     Procalcitonin [[id number]]  (Abnormal) Collected:  05/15/19 1504    Specimen:  Blood Updated:  05/15/19 1606     Procalcitonin 0.45 ng/mL     Narrative:       As a Marker for Sepsis (Non-Neonates):   1. <0.5 ng/mL represents a low risk of severe sepsis and/or septic shock.  1. >2 ng/mL represents a high risk of severe sepsis and/or septic shock.    As a Marker for Lower Respiratory Tract Infections that require antibiotic therapy:  PCT on Admission     Antibiotic Therapy             6-12 Hrs later  > 0.5                Strongly Recommended            >0.25 - <0.5         Recommended  0.1 - 0.25           Discouraged                   Remeasure/reassess PCT  <0.1                 Strongly Discouraged          Remeasure/reassess PCT      As 28 day mortality risk marker: \"Change in Procalcitonin Result\" (> 80 % or <=80 %) if Day 0 (or Day 1) and Day 4 values are available. Refer to http://www.[facility]-pct-calculator.com/   Change in PCT <=80 %   A decrease of PCT levels below or equal to 80 % defines a positive change in PCT test result representing a higher risk for 28-day all-cause mortality of patients diagnosed with severe sepsis or septic shock.  Change in PCT > 80 %   A decrease of PCT levels of more than 80 % defines a negative change in PCT result representing a lower risk for 28-day all-cause mortality of patients diagnosed with severe sepsis or septic shock.                hCG, Serum, Qualitative [[id number]]  (Normal) Collected:  05/15/19 1504    Specimen:  Blood Updated:  05/15/19 1549     HCG Qualitative Negative    CBC Auto Differential [[id number]]  (Abnormal) Collected:  05/15/19 " 1504    Specimen:  Blood Updated:  05/15/19 1521     WBC 26.84 10*3/mm3      RBC 3.51 10*6/mm3      Hemoglobin 11.5 g/dL      Hematocrit 36.0 %      .6 fL      MCH 32.8 pg      MCHC 31.9 g/dL      RDW 18.0 %      RDW-SD 68.1 fl      MPV 10.1 fL      Platelets 243 10*3/mm3      Neutrophil % 79.0 %      Lymphocyte % 6.6 %      Monocyte % 12.4 %      Eosinophil % 0.0 %      Basophil % 0.3 %      Immature Grans % 1.7 %      Neutrophils, Absolute 21.20 10*3/mm3      Lymphocytes, Absolute 1.77 10*3/mm3      Monocytes, Absolute 3.34 10*3/mm3      Eosinophils, Absolute 0.01 10*3/mm3      Basophils, Absolute 0.07 10*3/mm3      Immature Grans, Absolute 0.45 10*3/mm3      nRBC 0.0 /100 WBC     Blood Culture - Blood, Hand, Right [[id number]] Collected:  05/15/19 1504    Specimen:  Blood from Hand, Right Updated:  05/15/19 1537    Lactic Acid, Plasma [[id number]]  (Normal) Collected:  05/15/19 1523    Specimen:  Blood Updated:  05/15/19 1543     Lactate 1.0 mmol/L     Wound Culture - Wound, Buttock, Right [[id number]] Collected:  05/15/19 1535    Specimen:  Wound from Buttock, Right Updated:  05/15/19 1907     Gram Stain Rare (1+) WBCs seen      Few (2+) Gram positive bacilli      Rare (1+) Gram positive cocci      Occasional Gram negative bacilli    Blood Culture - Blood, Hand, Right [[id number]] Collected:  05/15/19 1557    Specimen:  Blood from Hand, Right Updated:  05/15/19 1605    Protime-INR [[id number]]  (Abnormal) Collected:  05/15/19 1557    Specimen:  Blood Updated:  05/15/19 1624     Protime 16.0 Seconds      INR 1.31    Urine Drug Screen - Urine, Clean Catch [[id number]]  (Abnormal) Collected:  05/15/19 1630    Specimen:  Urine, Clean Catch Updated:  05/15/19 1704     Amphet/Methamphet, Screen Negative     Barbiturates Screen, Urine Negative     Benzodiazepine Screen, Urine Negative     Cocaine Screen, Urine Negative     Opiate Screen Positive     THC, Screen, Urine Positive     Methadone Screen, Urine Negative      Oxycodone Screen, Urine Negative    Narrative:       Negative Thresholds For Drugs Screened:     Amphetamines               500 ng/ml   Barbiturates               200 ng/ml   Benzodiazepines            100 ng/ml   Cocaine                    300 ng/ml   Methadone                  300 ng/ml   Opiates                    300 ng/ml   Oxycodone                  100 ng/ml   THC                        50 ng/ml    The Normal Value for all drugs tested is negative. This report includes final unconfirmed screening results to be used for medical treatment purposes only. Unconfirmed results must not be used for non-medical purposes such as employment or legal testing. Clinical consideration should be applied to any drug of abuse test, particulary when unconfirmed results are used.    Urinalysis With Microscopic If Indicated (No Culture) - Urine, Clean Catch [[id number]]  (Abnormal) Collected:  05/15/19 1630    Specimen:  Urine, Clean Catch Updated:  05/15/19 1655     Color, UA Yellow     Appearance, UA Clear     pH, UA 6.5     Specific Gravity, UA 1.013     Glucose, UA Negative     Ketones, UA Trace     Bilirubin, UA Negative     Blood, UA Trace     Protein, UA Negative     Leuk Esterase, UA Moderate (2+)     Nitrite, UA Negative     Urobilinogen, UA 1.0 E.U./dL    Urinalysis, Microscopic Only - Urine, Clean Catch [[id number]]  (Abnormal) Collected:  05/15/19 1630    Specimen:  Urine, Clean Catch Updated:  05/15/19 1656     RBC, UA 0-2 /HPF      WBC, UA 6-12 /HPF      Bacteria, UA None Seen /HPF      Squamous Epithelial Cells, UA 3-6 /HPF      Hyaline Casts, UA 0-2 /LPF      Methodology Automated Microscopy          I ordered the above labs and reviewed the results    RADIOLOGY  XR Chest 2 View   FINDINGS:  The lungs are well-expanded with some patchy pneumonia in the  left perihilar region and continued follow-up evaluation is recommended.  The heart size is normal.           I ordered the above noted radiological studies.  Interpreted by radiologist. Reviewed by me in PACS.     Critical Care  Performed by: Clinton Pena MD  Authorized by: Clinton Pena MD     Critical care provider statement:     Critical care time (minutes):  30    Critical care start time:  5/15/2019 3:12 PM    Critical care end time:  5/15/2019 5:39 PM    Critical care time was exclusive of:  Separately billable procedures and treating other patients    Critical care was necessary to treat or prevent imminent or life-threatening deterioration of the following conditions:  Sepsis    Critical care was time spent personally by me on the following activities:  Blood draw for specimens, development of treatment plan with patient or surrogate, discussions with consultants, discussions with primary provider, evaluation of patient's response to treatment, examination of patient, obtaining history from patient or surrogate, ordering and performing treatments and interventions, ordering and review of laboratory studies, ordering and review of radiographic studies, pulse oximetry and re-evaluation of patient's condition    I assumed direction of critical care for this patient from another provider in my specialty: yes              PROGRESS AND CONSULTS       1507 - Lab work and CXR ordered for further evaluation.     1518 - IVF, zosyn, and vancomycin ordered.     1521 - Call placed with general surgery.     1543 - Discussed pt's case with Dr. Jesus (surgery) who agreed to see pt in ED prior to admission.     1631 - Pt rechecked and resting comfortably, states she was visited in ED by Dr. Jesus who agreed to take pt to surgery. Pt understands and agrees with the plan, all questions answered.    1632: Call placed to Pulmonology for possible ICU admission.     1648: Discussed pt's case with Dr. Covarrubias (Pul) who states pt is able to be admitted to monitor bed following surgery.     1651: Call placed to [facility].     1733: Discussed pt's case with Dr. Marie ([facility]) who  agreed to admit pt to Med/Surg following surgery with Dr. Jesus.    1737: Pt rechecked and resting comfortably, appears improved. Discussed with pt further the results of labs and plan for admission as prior discussed. At this time, pt's vitals are: BP- 103/59 HR- 106 Temp- 99.2 °F (37.3 °C) (Oral) O2 sat- 97%      MEDICAL DECISION MAKING  Results were reviewed/discussed with the patient and they were also made aware of online access. Pt also made aware that some labs, such as cultures, will not be resulted during ER visit and follow up with PMD is necessary.     MDM  Number of Diagnoses or Management Options     Amount and/or Complexity of Data Reviewed  Clinical lab tests: reviewed and ordered (WBC - 26.84  Sodium - 133  Potassium - 3.2)  Tests in the radiology section of CPT®: reviewed and ordered (CXR - patchy pneumonia in L perihilar region)  Discuss the patient with other providers: yes (Dr. Jesus (surgeon)  Dr. Covarrubias (Pulm)  Dr. Marie (A))           DIAGNOSIS  Final diagnoses:   Sepsis, due to unspecified organism (CMS/HCC)   Stomatitis   Behcet's disease (CMS/HCC)   Polypharmacy   Abscess of right buttock   Hypokalemia       DISPOSITION  ADMISSION    Discussed treatment plan and reason for admission with pt/family and admitting physician.  Pt/family voiced understanding of the plan for admission for further testing/treatment as needed.     Latest Documented Vital Signs:  As of 8:05 PM  BP- 103/64 HR- 105 Temp- 99.4 °F (37.4 °C) (Oral) O2 sat- 99%    --  Documentation assistance provided by roxanna Rogers and Phyllis Fung for Dr. Pena.  Information recorded by the scribe was done at my direction and has been verified and validated by me.              Phyllis Fung  05/15/19 1951       Clinton Pena MD  05/15/19 2005

## 2019-05-15 NOTE — ED NOTES
OR tech at bedside to take pt to OR. Report had been called to the floor. Let the floor know pt was going to OR     Nayana Arbeu RN  05/15/19 1065

## 2019-05-16 ENCOUNTER — APPOINTMENT (OUTPATIENT)
Dept: CT IMAGING | Facility: HOSPITAL | Age: 29
End: 2019-05-16

## 2019-05-16 PROBLEM — F11.10 HEROIN ABUSE (HCC): Chronic | Status: ACTIVE | Noted: 2019-05-16

## 2019-05-16 PROBLEM — A41.9 SEPSIS ASSOCIATED HYPOTENSION (HCC): Status: ACTIVE | Noted: 2019-05-16

## 2019-05-16 PROBLEM — I95.9 SEPSIS ASSOCIATED HYPOTENSION (HCC): Status: ACTIVE | Noted: 2019-05-16

## 2019-05-16 PROBLEM — L02.31 CELLULITIS AND ABSCESS OF BUTTOCK: Status: ACTIVE | Noted: 2019-05-16

## 2019-05-16 PROBLEM — E87.6 HYPOKALEMIA: Status: ACTIVE | Noted: 2019-05-16

## 2019-05-16 PROBLEM — D72.829 LEUKOCYTOSIS: Status: ACTIVE | Noted: 2019-05-16

## 2019-05-16 PROBLEM — J18.9 COMMUNITY ACQUIRED PNEUMONIA OF LEFT LUNG: Status: ACTIVE | Noted: 2019-05-16

## 2019-05-16 PROBLEM — L03.317 CELLULITIS AND ABSCESS OF BUTTOCK: Status: ACTIVE | Noted: 2019-05-16

## 2019-05-16 PROBLEM — E87.1 HYPONATREMIA: Status: ACTIVE | Noted: 2019-05-16

## 2019-05-16 LAB
ALBUMIN SERPL-MCNC: 2.9 G/DL (ref 3.5–5.2)
ALBUMIN/GLOB SERPL: 0.9 G/DL
ALP SERPL-CCNC: 64 U/L (ref 39–117)
ALT SERPL W P-5'-P-CCNC: 9 U/L (ref 1–33)
ANION GAP SERPL CALCULATED.3IONS-SCNC: 10.8 MMOL/L
ANISOCYTOSIS BLD QL: ABNORMAL
AST SERPL-CCNC: 13 U/L (ref 1–32)
B PARAPERT DNA SPEC QL NAA+PROBE: NOT DETECTED
B PERT DNA SPEC QL NAA+PROBE: NOT DETECTED
BILIRUB SERPL-MCNC: 0.9 MG/DL (ref 0.2–1.2)
BUN BLD-MCNC: 8 MG/DL (ref 6–20)
BUN/CREAT SERPL: 14 (ref 7–25)
C PNEUM DNA NPH QL NAA+NON-PROBE: NOT DETECTED
CALCIUM SPEC-SCNC: 8 MG/DL (ref 8.6–10.5)
CHLORIDE SERPL-SCNC: 107 MMOL/L (ref 98–107)
CO2 SERPL-SCNC: 24.2 MMOL/L (ref 22–29)
CREAT BLD-MCNC: 0.57 MG/DL (ref 0.57–1)
DEPRECATED RDW RBC AUTO: 68 FL (ref 37–54)
ERYTHROCYTE [DISTWIDTH] IN BLOOD BY AUTOMATED COUNT: 17.8 % (ref 12.3–15.4)
FLUAV H1 2009 PAND RNA NPH QL NAA+PROBE: NOT DETECTED
FLUAV H1 HA GENE NPH QL NAA+PROBE: NOT DETECTED
FLUAV H3 RNA NPH QL NAA+PROBE: NOT DETECTED
FLUAV SUBTYP SPEC NAA+PROBE: NOT DETECTED
FLUBV RNA ISLT QL NAA+PROBE: NOT DETECTED
GFR SERPL CREATININE-BSD FRML MDRD: >150 ML/MIN/1.73
GLOBULIN UR ELPH-MCNC: 3.2 GM/DL
GLUCOSE BLD-MCNC: 102 MG/DL (ref 65–99)
HADV DNA SPEC NAA+PROBE: NOT DETECTED
HCOV 229E RNA SPEC QL NAA+PROBE: NOT DETECTED
HCOV HKU1 RNA SPEC QL NAA+PROBE: NOT DETECTED
HCOV NL63 RNA SPEC QL NAA+PROBE: NOT DETECTED
HCOV OC43 RNA SPEC QL NAA+PROBE: NOT DETECTED
HCT VFR BLD AUTO: 30.7 % (ref 34–46.6)
HGB BLD-MCNC: 9.7 G/DL (ref 12–15.9)
HMPV RNA NPH QL NAA+NON-PROBE: NOT DETECTED
HPIV1 RNA SPEC QL NAA+PROBE: NOT DETECTED
HPIV2 RNA SPEC QL NAA+PROBE: NOT DETECTED
HPIV3 RNA NPH QL NAA+PROBE: NOT DETECTED
HPIV4 P GENE NPH QL NAA+PROBE: NOT DETECTED
HYPOCHROMIA BLD QL: ABNORMAL
L PNEUMO1 AG UR QL IA: NEGATIVE
LYMPHOCYTES # BLD MANUAL: 1.51 10*3/MM3 (ref 0.7–3.1)
LYMPHOCYTES NFR BLD MANUAL: 6.1 % (ref 5–12)
LYMPHOCYTES NFR BLD MANUAL: 7.1 % (ref 19.6–45.3)
M PNEUMO IGG SER IA-ACNC: NOT DETECTED
MCH RBC QN AUTO: 32.8 PG (ref 26.6–33)
MCHC RBC AUTO-ENTMCNC: 31.6 G/DL (ref 31.5–35.7)
MCV RBC AUTO: 103.7 FL (ref 79–97)
MONOCYTES # BLD AUTO: 1.29 10*3/MM3 (ref 0.1–0.9)
NEUTROPHILS # BLD AUTO: 18.44 10*3/MM3 (ref 1.7–7)
NEUTROPHILS NFR BLD MANUAL: 86.9 % (ref 42.7–76)
PLAT MORPH BLD: NORMAL
PLATELET # BLD AUTO: 191 10*3/MM3 (ref 140–450)
PMV BLD AUTO: 10 FL (ref 6–12)
POLYCHROMASIA BLD QL SMEAR: ABNORMAL
POTASSIUM BLD-SCNC: 3.6 MMOL/L (ref 3.5–5.2)
PROT SERPL-MCNC: 6.1 G/DL (ref 6–8.5)
RBC # BLD AUTO: 2.96 10*6/MM3 (ref 3.77–5.28)
RHINOVIRUS RNA SPEC NAA+PROBE: NOT DETECTED
RSV RNA NPH QL NAA+NON-PROBE: NOT DETECTED
S PNEUM AG SPEC QL LA: NEGATIVE
SODIUM BLD-SCNC: 142 MMOL/L (ref 136–145)
WBC MORPH BLD: NORMAL
WBC NRBC COR # BLD: 21.22 10*3/MM3 (ref 3.4–10.8)

## 2019-05-16 PROCEDURE — 63710000001 PREDNISONE PER 5 MG: Performed by: HOSPITALIST

## 2019-05-16 PROCEDURE — 87205 SMEAR GRAM STAIN: CPT | Performed by: HOSPITALIST

## 2019-05-16 PROCEDURE — 87633 RESP VIRUS 12-25 TARGETS: CPT | Performed by: HOSPITALIST

## 2019-05-16 PROCEDURE — 80053 COMPREHEN METABOLIC PANEL: CPT | Performed by: HOSPITALIST

## 2019-05-16 PROCEDURE — 85007 BL SMEAR W/DIFF WBC COUNT: CPT | Performed by: HOSPITALIST

## 2019-05-16 PROCEDURE — 25010000002 VANCOMYCIN 750 MG RECONSTITUTED SOLUTION: Performed by: HOSPITALIST

## 2019-05-16 PROCEDURE — 87899 AGENT NOS ASSAY W/OPTIC: CPT | Performed by: HOSPITALIST

## 2019-05-16 PROCEDURE — 85025 COMPLETE CBC W/AUTO DIFF WBC: CPT | Performed by: HOSPITALIST

## 2019-05-16 PROCEDURE — 87486 CHLMYD PNEUM DNA AMP PROBE: CPT | Performed by: HOSPITALIST

## 2019-05-16 PROCEDURE — 87581 M.PNEUMON DNA AMP PROBE: CPT | Performed by: HOSPITALIST

## 2019-05-16 PROCEDURE — 25010000002 PIPERACILLIN SOD-TAZOBACTAM PER 1 G: Performed by: SURGERY

## 2019-05-16 PROCEDURE — 87070 CULTURE OTHR SPECIMN AEROBIC: CPT | Performed by: HOSPITALIST

## 2019-05-16 PROCEDURE — 99024 POSTOP FOLLOW-UP VISIT: CPT | Performed by: SURGERY

## 2019-05-16 PROCEDURE — 87798 DETECT AGENT NOS DNA AMP: CPT | Performed by: HOSPITALIST

## 2019-05-16 PROCEDURE — 71250 CT THORAX DX C-: CPT

## 2019-05-16 RX ORDER — ACETAMINOPHEN 325 MG/1
650 TABLET ORAL EVERY 6 HOURS PRN
Status: DISCONTINUED | OUTPATIENT
Start: 2019-05-16 | End: 2019-05-17 | Stop reason: HOSPADM

## 2019-05-16 RX ORDER — POTASSIUM CHLORIDE 750 MG/1
40 CAPSULE, EXTENDED RELEASE ORAL AS NEEDED
Status: DISCONTINUED | OUTPATIENT
Start: 2019-05-16 | End: 2019-05-17 | Stop reason: HOSPADM

## 2019-05-16 RX ORDER — POTASSIUM CHLORIDE 1.5 G/1.77G
40 POWDER, FOR SOLUTION ORAL AS NEEDED
Status: DISCONTINUED | OUTPATIENT
Start: 2019-05-16 | End: 2019-05-17 | Stop reason: HOSPADM

## 2019-05-16 RX ORDER — SUCRALFATE ORAL 1 G/10ML
1 SUSPENSION ORAL
Status: DISCONTINUED | OUTPATIENT
Start: 2019-05-16 | End: 2019-05-17 | Stop reason: HOSPADM

## 2019-05-16 RX ADMIN — HYDROCODONE BITARTRATE AND ACETAMINOPHEN 1 TABLET: 10; 325 TABLET ORAL at 12:02

## 2019-05-16 RX ADMIN — SUCRALFATE 1 G: 1 SUSPENSION ORAL at 16:52

## 2019-05-16 RX ADMIN — HYDROCODONE BITARTRATE AND ACETAMINOPHEN 1 TABLET: 10; 325 TABLET ORAL at 07:00

## 2019-05-16 RX ADMIN — PREDNISONE 60 MG: 50 TABLET ORAL at 09:59

## 2019-05-16 RX ADMIN — TAZOBACTAM SODIUM AND PIPERACILLIN SODIUM 3.38 G: 375; 3 INJECTION, SOLUTION INTRAVENOUS at 13:27

## 2019-05-16 RX ADMIN — HYDROCODONE BITARTRATE AND ACETAMINOPHEN 1 TABLET: 10; 325 TABLET ORAL at 00:21

## 2019-05-16 RX ADMIN — SUCRALFATE 1 G: 1 SUSPENSION ORAL at 06:35

## 2019-05-16 RX ADMIN — SUCRALFATE 1 G: 1 SUSPENSION ORAL at 21:15

## 2019-05-16 RX ADMIN — TAZOBACTAM SODIUM AND PIPERACILLIN SODIUM 3.38 G: 375; 3 INJECTION, SOLUTION INTRAVENOUS at 21:15

## 2019-05-16 RX ADMIN — ACETAMINOPHEN 650 MG: 325 TABLET, FILM COATED ORAL at 01:53

## 2019-05-16 RX ADMIN — TAZOBACTAM SODIUM AND PIPERACILLIN SODIUM 3.38 G: 375; 3 INJECTION, SOLUTION INTRAVENOUS at 04:41

## 2019-05-16 RX ADMIN — SODIUM CHLORIDE 125 ML/HR: 9 INJECTION, SOLUTION INTRAVENOUS at 06:36

## 2019-05-16 RX ADMIN — HYDROCODONE BITARTRATE AND ACETAMINOPHEN 1 TABLET: 10; 325 TABLET ORAL at 16:52

## 2019-05-16 RX ADMIN — SODIUM CHLORIDE 750 MG: 900 INJECTION, SOLUTION INTRAVENOUS at 12:00

## 2019-05-16 RX ADMIN — HYDROCODONE BITARTRATE AND ACETAMINOPHEN 1 TABLET: 10; 325 TABLET ORAL at 22:24

## 2019-05-16 RX ADMIN — SUCRALFATE 1 G: 1 SUSPENSION ORAL at 12:00

## 2019-05-16 RX ADMIN — SODIUM CHLORIDE 750 MG: 900 INJECTION, SOLUTION INTRAVENOUS at 03:17

## 2019-05-16 NOTE — PROGRESS NOTES
"Pharmacy Consult - Zosyn Dosing     Grisel Allen has a consult for pharmacy to dose Zosyn for abscess of right buttock.  Pharmacy dosing Zosyn per Dr. Marie's request.       Relevant clinical data and objective history reviewed:  28 y.o. female 157.5 cm (62\") 68.3 kg (150 lb 9.2 oz)    Past Medical History:   Diagnosis Date   • ADD (attention deficit disorder)    • Anxiety    • Autoimmune disease (CMS/[facility])    • Behcet's disease (CMS/[facility])    • Behcet's syndrome (CMS/HCC)    • Depression    • Eye exam, routine 2013   • Heroin abuse (CMS/[facility])    • Pap smear, as part of routine gynecological examination 01/2015    NORMAL     Creatinine   Date Value Ref Range Status   05/15/2019 0.72 0.57 - 1.00 mg/dL Final   12/14/2017 0.60 0.57 - 1.00 mg/dL Final   08/14/2017 0.75 0.57 - 1.00 mg/dL Final     BUN   Date Value Ref Range Status   05/15/2019 10 6 - 20 mg/dL Final     Estimated Creatinine Clearance: 106.4 mL/min (by C-G formula based on SCr of 0.72 mg/dL).    Lab Results   Component Value Date    WBC 26.84 (H) 05/15/2019     Temp Readings from Last 3 Encounters:   05/15/19 99.9 °F (37.7 °C) (Oral)   02/16/19 97.6 °F (36.4 °C) (Oral)   05/09/18 98.8 °F (37.1 °C)          Assessment/Plan  Will start Zosyn 3.375g IV q8h. Pharmacy will continue to follow daily while on Zosyn and adjust as needed. Pharmacy will discontinue the Pharmacy to Dose consult at this time. Renal function will continue to be monitored and dosing adjustments will be made by pharmacy based on renal function if necessary.      Eden Elizabeth, [facility]    "

## 2019-05-16 NOTE — PLAN OF CARE
Problem: Patient Care Overview  Goal: Plan of Care Review   05/16/19 0252 05/16/19 0606   Plan of Care Review   Progress --  no change   OTHER   Outcome Summary --  New admit this shift for abcess of right buttock. I&D performed by Nithin last night. Wound packed with gauze and covered with abd pad. Monitored overnight for drainage. HR elevated early this shift, 110-120s. Now improved in the 90s. Low grade fever of 101.6 overnight, MD notified and tylenol was given. IV fluids given per order. Zosyn and Vancomycin were given as well. Monitored for s/s withdraw as patient has admits to current illicit drug use. Access consult placed. Will continue to closely monitor.    Coping/Psychosocial   Plan of Care Reviewed With patient --        Problem: Sepsis/Septic Shock (Adult)  Goal: Signs and Symptoms of Listed Potential Problems Will be Absent, Minimized or Managed (Sepsis/Septic Shock)  Outcome: Ongoing (interventions implemented as appropriate)

## 2019-05-16 NOTE — H&P
"      Patient Care Team:  Provider, No Known as PCP - General    Chief complaint right groin pain and oral ulcers    Subjective     Pleasant 29yo woman with polysubstance abuse (Adderall, THC, and snorts heroin) and Behcet's, who presented to ER with c/o one week h/o painful swelling of area on right buttock that has now been draining for about 24h. She also noted subjective fever and \"withdrawal\" from heroin (had not used in 2 days). She c/o several days of worsening, painful oral ulcers c/w a flare of her Behcet's. She denies any genital lesions. She admits to a few days of non-productive cough        Review of Systems   Constitutional: Positive for appetite change, chills and fatigue. Negative for diaphoresis and fever.   HENT: Positive for mouth sores, sore throat and trouble swallowing. Negative for congestion, dental problem, ear pain, facial swelling, hearing loss, nosebleeds, rhinorrhea, sinus pressure, tinnitus and voice change.    Eyes: Negative for pain and visual disturbance.   Respiratory: Positive for cough. Negative for choking, chest tightness, shortness of breath and stridor.    Cardiovascular: Negative for chest pain, palpitations and leg swelling.   Gastrointestinal: Positive for nausea. Negative for abdominal pain, blood in stool, diarrhea and vomiting.   Endocrine: Negative for cold intolerance and heat intolerance.   Genitourinary: Negative for decreased urine volume, difficulty urinating, dysuria and hematuria.   Musculoskeletal: Negative for back pain and neck pain.   Skin: Positive for wound. Negative for color change and pallor.   Allergic/Immunologic: Negative for environmental allergies and food allergies.   Neurological: Negative for tremors, seizures, syncope, facial asymmetry, speech difficulty, light-headedness, numbness and headaches.   Hematological: Negative for adenopathy. Does not bruise/bleed easily.   Psychiatric/Behavioral: Negative for behavioral problems, confusion and " hallucinations.        Past Medical History:   Diagnosis Date   • ADD (attention deficit disorder)    • Anxiety    • Autoimmune disease (CMS/HCC)    • Behcet's disease (CMS/HCC)    • Behcet's syndrome (CMS/HCC)    • Depression    • Eye exam, routine 2013   • Heroin abuse (CMS/HCC)    • Pap smear, as part of routine gynecological examination 01/2015    NORMAL     Past Surgical History:   Procedure Laterality Date   • BREAST CYST EXCISION  2010   • DECUBITUS ULCER EXCISION     • TONSILLECTOMY  2004     Family History   Problem Relation Age of Onset   • Diabetes Mother      Social History     Tobacco Use   • Smoking status: Former Smoker     Packs/day: 0.50     Years: 5.00     Pack years: 2.50     Types: Cigarettes   • Smokeless tobacco: Never Used   Substance Use Topics   • Alcohol use: Yes     Comment: occ   • Drug use: No     Medications Prior to Admission   Medication Sig Dispense Refill Last Dose   • ALPRAZolam (XANAX PO) Take 0.5 mg by mouth 3 (Three) Times a Day.      • Amphetamine-Dextroamphetamine (ADDERALL PO) Take 10 mg by mouth 2 (Two) Times a Day.        Allergies:  Patient has no known allergies.    Objective      Vital Signs  Temp:  [99.2 °F (37.3 °C)-103 °F (39.4 °C)] 99.9 °F (37.7 °C)  Heart Rate:  [103-130] 111  Resp:  [16-20] 16  BP: ()/(44-74) 106/74    Physical Exam   Constitutional: She is oriented to person, place, and time. She appears well-developed and well-nourished. No distress.   HENT:   Head: Normocephalic and atraumatic.   Mouth/Throat: No oropharyngeal exudate.   Shallow pale ulcerations of tongue and oral mucosa   Eyes: Conjunctivae and EOM are normal. Pupils are equal, round, and reactive to light. Right eye exhibits no discharge. Left eye exhibits no discharge. No scleral icterus.   Neck: Normal range of motion. Neck supple. No thyromegaly present.   Cardiovascular: Normal rate, regular rhythm, normal heart sounds and intact distal pulses.   No murmur heard.  Pulmonary/Chest:  Effort normal and breath sounds normal. No respiratory distress.   Abdominal: Soft. Bowel sounds are normal. She exhibits no distension. There is no tenderness.   Musculoskeletal: Normal range of motion. She exhibits no edema or deformity.   Lymphadenopathy:     She has no cervical adenopathy.   Neurological: She is alert and oriented to person, place, and time. No cranial nerve deficit or sensory deficit.   Skin: Skin is warm and dry. Capillary refill takes less than 2 seconds. She is not diaphoretic.   Dressing in place right buttock   Psychiatric: She has a normal mood and affect. Her behavior is normal.   Nursing note and vitals reviewed.      Results Review:   I reviewed the patient's new clinical results.  I reviewed the patient's new imaging results and agree with the interpretation.  I reviewed the patient's other test results and agree with the interpretation  I personally viewed and interpreted the patient's EKG/Telemetry data  Discussed with patient, RN, and Dr. Pena      Assessment/Plan       Cellulitis and abscess of buttock    ADD (attention deficit disorder)    Behcet's syndrome (CMS/[facility])    Depression    Sepsis (CMS/[facility])    Heroin abuse (CMS/[facility])    Community acquired pneumonia of left lung    Sepsis associated hypotension (CMS/HCC)    Leukocytosis    Hypokalemia    Hyponatremia          Plan:   (POD#1 s/p I&D of right groin abscess with cellulitis  Continue IV Zosyn and Vanc  Blood and wound cultures sent  Trend WBC and temp  Appreciate Dr. Pavon's attention to pt  Analgesia with opioids to avoid withdrawal  B[place]oshe IVFs, monitor for hypotension  Tachycardia improved since admission  Replace K+ orally  Monitor Na+ with IVFs  CXR noted, will check CT chest and resp cultures  For what appears to be a flare of her Behcet's will start 5 day burst of oral prednisone at 1mg/kg/day  Also try oral carafate  ACCESS to see regarding her heroin use  Full code confirmed  SCDs for DVT ppx  Further orders  to follow as suggested by evolving hospital course  ).       I discussed the patients findings and my recommendations with patient, nursing staff and consulting provider    Tomás Marie MD  05/16/19  1:39 AM    Time: 45min

## 2019-05-16 NOTE — ED NOTES
Iv team called and asked if they need to be consulted about right iv ac small infiltration and they deny needing to follow up with pt.      Heather Reynaga, RN  05/15/19 0487

## 2019-05-16 NOTE — CONSULTS
"Pt is a 28 year old, single female seen today in room 622. On approach, she is resting in bed watching TV. She is accepting of interview but extremely flat and not very motivated or interested. She has no children, is currently unemployed and is staying with a friend. She states she has been using heroin and marijuana for approximately three years now. She has never sought treatment for her substance use. She has never attempted to quit on her own. She reports previous experiences with withdrawal symptoms when she would go a few days without using. She states she normally experiences really bad nausea, vomiting, and diarrhea. As well as diaphoresis, body aches and muscle cramps. Currently she states that she has \"cold sweats and body aches\". Her last heroin use was 3 days ago. She is vague with information about her family. But sister, Lexi is supportive. She denies any HI/SI. Denies any current wish to be dead and denies any previous suicide attempts. She sees Dr. Radha Lewis monthly, but does not see a therapist. She is prescribed xanax and adderall From Dr. Lewis (in which neither of these showed up on her tox screen)     She gives minimal responses to all questions and does not seem invested in our conversation. She does state that she is interested in seeking out treatment for her heroin use.     Spent time discussing differing levels of care from inpatient rehab/residential, IOP, to outpatient counseling. Pt reports that she is only interested in outpatient counseling at this point. Encouraged pt to look at other options as well since this is her first time at attempting to quit. Provided pt with list of [facility] providers at multiple levels of care including inpatient rehab, outpatient, intensive outpatient, and medication assisted treatment. Pt voices understanding and agreement and agrees to look over these at a later time. Will place on follow up list.   "

## 2019-05-16 NOTE — OP NOTE
PREOPERATIVE DIAGNOSIS:    Right buttocks abscess     POSTOPERATIVE DIAGNOSIS:   Right buttocks abscess    PROCEDURE:   Incision and drainage of right buttocks abscess    SURGEON:  Coy Pavon M.D.  ASSISTANT: None.  ANESTHESIA:  General.   BLOOD LOSS: 10 mL  COUNTS:  Needle and sponge count correct.  SPECIMENS:    Order Name Source Comment Collection Info Order Time   WOUND CULTURE Buttock, Right  Collected By: Coy Pavon MD 5/15/2019  8:06 PM       INDICATIONS FOR OPERATION: Grisel Allen is a 28 y.o. woman who has a right buttocks abscess of 2 days duration.  She is taken to the operating room for incision and drainage.       OPERATION:  The patient was brought to the operating room in stable condition.  She was administered Zosyn in the emergency department.  No additional perioperative antibiotics were given.  The patient was positioned supine on the operating room table.  General anesthesia was induced without difficulty. A surgical pause was performed.  Legs were frog-legged.  The perineum and right thigh were prepped with Betadine paint.  Sterile drapes were arranged.  The skin overlying the abscess on the right buttocks and inner thigh was prepped with Betadine paint.  Sterile drapes were arranged.  I used a 15 blade scalpel to open the skin over the abscess.  About a 3 cm incision was made.  I drained about 10 mL of purulent fluid.  Culture was sent.  The wound was irrigated with warm saline.  I packed the wound with a single gauze sponge.    She tolerated the procedure very well, and is transported to the recovery room in stable condition.

## 2019-05-16 NOTE — ANESTHESIA POSTPROCEDURE EVALUATION
"Patient: Grisel Allen    Procedure Summary     Date:  05/15/19 Room / Location:  [facility] OR  / [facility] MAIN OR    Anesthesia Start:  1930 Anesthesia Stop:  2005    Procedure:  INCISION AND DRAINAGE OF PERIRECTAL ABSCESS (Right Perirectal) Diagnosis:      Surgeon:  Coy Pavon MD Provider:  Tmoás Montano MD    Anesthesia Type:  general ASA Status:  2 - Emergent          Anesthesia Type: general  Last vitals  BP   91/45 (05/15/19 2121)   Temp   37.3 °C (99.2 °F) (05/15/19 2121)   Pulse   103 (05/15/19 2121)   Resp   16 (05/15/19 2121)     SpO2   99 % (05/15/19 2002)     Post Anesthesia Care and Evaluation    Patient location during evaluation: bedside  Patient participation: complete - patient participated  Level of consciousness: awake and alert  Pain management: adequate  Airway patency: patent  Anesthetic complications: No anesthetic complications  PONV Status: none  Cardiovascular status: acceptable  Respiratory status: acceptable  Hydration status: acceptable    Comments: BP 91/45 (BP Location: Right arm, Patient Position: Lying)   Pulse 103   Temp 37.3 °C (99.2 °F) (Oral)   Resp 16   Ht 157.5 cm (62\")   Wt 68.3 kg (150 lb 9.2 oz)   LMP 03/16/2019   SpO2 99%   BMI 27.54 kg/m²         "

## 2019-05-16 NOTE — PROGRESS NOTES
"Pharmacokinetic Consult - Vancomycin Dosing (Initial Note)    Grisel Allen has a consult for pharmacy to dose vancomycin x 5 days for abscess of right buttock.  Pharmacy dosing vancomycin per Dr. Marie's request.   Goal trough: 15-20 mg/L     Other ABX: Zosyn    Relevant clinical data and objective history reviewed:  28 y.o. female 157.5 cm (62\") 68.3 kg (150 lb 9.2 oz)    Past Medical History:   Diagnosis Date   • ADD (attention deficit disorder)    • Anxiety    • Autoimmune disease (CMS/HCC)    • Behcet's disease (CMS/HCC)    • Behcet's syndrome (CMS/HCC)    • Depression    • Eye exam, routine 2013   • Heroin abuse (CMS/[facility])    • Pap smear, as part of routine gynecological examination 01/2015    NORMAL     Creatinine   Date Value Ref Range Status   05/15/2019 0.72 0.57 - 1.00 mg/dL Final   12/14/2017 0.60 0.57 - 1.00 mg/dL Final   08/14/2017 0.75 0.57 - 1.00 mg/dL Final     BUN   Date Value Ref Range Status   05/15/2019 10 6 - 20 mg/dL Final     Estimated Creatinine Clearance: 106.4 mL/min (by C-G formula based on SCr of 0.72 mg/dL).    Lab Results   Component Value Date    WBC 26.84 (H) 05/15/2019     Temp Readings from Last 3 Encounters:   05/15/19 99.9 °F (37.7 °C) (Oral)   02/16/19 97.6 °F (36.4 °C) (Oral)   05/09/18 98.8 °F (37.1 °C)          Assessment/Plan  Will start vancomycin 750mg IV q8h. Vancomycin level to be drawn with 5th dose. Pharmacy will continue to follow daily while on vancomycin and adjust as needed.     Eden Elizabeth, [facility]    "

## 2019-05-16 NOTE — PLAN OF CARE
Problem: Patient Care Overview  Goal: Plan of Care Review  Outcome: Ongoing (interventions implemented as appropriate)   05/16/19 2716   Plan of Care Review   Progress no change   OTHER   Outcome Summary afebrile during shift today; pt standby assist- however took a shower without telling staff; incisional site in right buttocks was reviewed- dry gauze placed over site; IV zosyn continued; pt asking for PO pain meds q4 hrs; pt c/o some cold sweats and feeling poor overall; Access following now; will continue to monitor closely for withdrawal.   Coping/Psychosocial   Plan of Care Reviewed With patient     Goal: Individualization and Mutuality  Outcome: Ongoing (interventions implemented as appropriate)    Goal: Discharge Needs Assessment  Outcome: Ongoing (interventions implemented as appropriate)    Goal: Interprofessional Rounds/Family Conf  Outcome: Ongoing (interventions implemented as appropriate)      Problem: Sepsis/Septic Shock (Adult)  Goal: Signs and Symptoms of Listed Potential Problems Will be Absent, Minimized or Managed (Sepsis/Septic Shock)  Outcome: Ongoing (interventions implemented as appropriate)

## 2019-05-16 NOTE — PROGRESS NOTES
Seen by my partner after midnight.  Ms. Allen is a 28-year-old female with a history of her IV drug abuse as well as asked abuse of benzodiazepines.  Per nursing staff no active signs of withdrawal currently.  But is been about 24 hours since she last used.  She is receiving oral pain medication.  But has not required any IV pain meds.  She was taken to the OR last night for incision and drainage and cultures are growing group A strep.  Plan at this point is to continue the Zosyn which can probably stop the vancomycin.  She did have a CT scan done of the chest to evaluate some abnormalities noted on her chest x-ray.  There is some question of pneumonia there.  She does have a leukocytosis and was having some fevers but for review of records does not show any respiratory symptoms.  We will check a procalcitonin in the morning.  Again the patient was discussed on multidisciplinary rounds.  Roly Maria MD  4:15 PM

## 2019-05-17 VITALS
HEART RATE: 79 BPM | BODY MASS INDEX: 27.71 KG/M2 | TEMPERATURE: 97.9 F | WEIGHT: 150.57 LBS | RESPIRATION RATE: 16 BRPM | HEIGHT: 62 IN | OXYGEN SATURATION: 98 % | SYSTOLIC BLOOD PRESSURE: 101 MMHG | DIASTOLIC BLOOD PRESSURE: 64 MMHG

## 2019-05-17 LAB
ALBUMIN SERPL-MCNC: 3.4 G/DL (ref 3.5–5.2)
ANION GAP SERPL CALCULATED.3IONS-SCNC: 13.2 MMOL/L
BACTERIA SPEC AEROBE CULT: ABNORMAL
BUN BLD-MCNC: 7 MG/DL (ref 6–20)
BUN/CREAT SERPL: 12.1 (ref 7–25)
CALCIUM SPEC-SCNC: 8.5 MG/DL (ref 8.6–10.5)
CHLORIDE SERPL-SCNC: 109 MMOL/L (ref 98–107)
CO2 SERPL-SCNC: 22.8 MMOL/L (ref 22–29)
CREAT BLD-MCNC: 0.58 MG/DL (ref 0.57–1)
DEPRECATED RDW RBC AUTO: 67.6 FL (ref 37–54)
ERYTHROCYTE [DISTWIDTH] IN BLOOD BY AUTOMATED COUNT: 17.7 % (ref 12.3–15.4)
GFR SERPL CREATININE-BSD FRML MDRD: >150 ML/MIN/1.73
GLUCOSE BLD-MCNC: 120 MG/DL (ref 65–99)
GRAM STN SPEC: ABNORMAL
HCT VFR BLD AUTO: 30.5 % (ref 34–46.6)
HGB BLD-MCNC: 9.7 G/DL (ref 12–15.9)
MAGNESIUM SERPL-MCNC: 1.9 MG/DL (ref 1.6–2.6)
MCH RBC QN AUTO: 33.1 PG (ref 26.6–33)
MCHC RBC AUTO-ENTMCNC: 31.8 G/DL (ref 31.5–35.7)
MCV RBC AUTO: 104.1 FL (ref 79–97)
PHOSPHATE SERPL-MCNC: 2.1 MG/DL (ref 2.5–4.5)
PLATELET # BLD AUTO: 218 10*3/MM3 (ref 140–450)
PMV BLD AUTO: 10.4 FL (ref 6–12)
POTASSIUM BLD-SCNC: 3.2 MMOL/L (ref 3.5–5.2)
RBC # BLD AUTO: 2.93 10*6/MM3 (ref 3.77–5.28)
SODIUM BLD-SCNC: 145 MMOL/L (ref 136–145)
STREP GROUPING: ABNORMAL
STREP GROUPING: ABNORMAL
WBC NRBC COR # BLD: 25.87 10*3/MM3 (ref 3.4–10.8)

## 2019-05-17 PROCEDURE — 63710000001 PREDNISONE PER 5 MG: Performed by: HOSPITALIST

## 2019-05-17 PROCEDURE — 83735 ASSAY OF MAGNESIUM: CPT | Performed by: HOSPITALIST

## 2019-05-17 PROCEDURE — 25010000002 PIPERACILLIN SOD-TAZOBACTAM PER 1 G: Performed by: SURGERY

## 2019-05-17 PROCEDURE — 80069 RENAL FUNCTION PANEL: CPT | Performed by: HOSPITALIST

## 2019-05-17 PROCEDURE — 85027 COMPLETE CBC AUTOMATED: CPT | Performed by: HOSPITALIST

## 2019-05-17 RX ORDER — AMOXICILLIN AND CLAVULANATE POTASSIUM 875; 125 MG/1; MG/1
1 TABLET, FILM COATED ORAL EVERY 12 HOURS SCHEDULED
Status: DISCONTINUED | OUTPATIENT
Start: 2019-05-17 | End: 2019-05-17 | Stop reason: HOSPADM

## 2019-05-17 RX ORDER — PREDNISONE 20 MG/1
40 TABLET ORAL
Qty: 4 TABLET | Refills: 0 | Status: SHIPPED | OUTPATIENT
Start: 2019-05-18 | End: 2019-05-20

## 2019-05-17 RX ORDER — AMOXICILLIN AND CLAVULANATE POTASSIUM 875; 125 MG/1; MG/1
1 TABLET, FILM COATED ORAL EVERY 12 HOURS SCHEDULED
Qty: 20 TABLET | Refills: 0 | Status: SHIPPED | OUTPATIENT
Start: 2019-05-17 | End: 2019-05-22 | Stop reason: SDUPTHER

## 2019-05-17 RX ORDER — DOXYCYCLINE 100 MG/1
100 CAPSULE ORAL EVERY 12 HOURS SCHEDULED
Qty: 20 CAPSULE | Refills: 0 | Status: SHIPPED | OUTPATIENT
Start: 2019-05-17 | End: 2019-05-27

## 2019-05-17 RX ORDER — DOXYCYCLINE 100 MG/1
100 CAPSULE ORAL EVERY 12 HOURS SCHEDULED
Status: DISCONTINUED | OUTPATIENT
Start: 2019-05-17 | End: 2019-05-17 | Stop reason: HOSPADM

## 2019-05-17 RX ADMIN — SUCRALFATE 1 G: 1 SUSPENSION ORAL at 11:47

## 2019-05-17 RX ADMIN — HYDROCODONE BITARTRATE AND ACETAMINOPHEN 1 TABLET: 10; 325 TABLET ORAL at 11:49

## 2019-05-17 RX ADMIN — SUCRALFATE 1 G: 1 SUSPENSION ORAL at 06:32

## 2019-05-17 RX ADMIN — HYDROCODONE BITARTRATE AND ACETAMINOPHEN 1 TABLET: 10; 325 TABLET ORAL at 06:31

## 2019-05-17 RX ADMIN — POTASSIUM CHLORIDE 40 MEQ: 750 CAPSULE, EXTENDED RELEASE ORAL at 06:31

## 2019-05-17 RX ADMIN — POTASSIUM CHLORIDE 40 MEQ: 750 CAPSULE, EXTENDED RELEASE ORAL at 10:45

## 2019-05-17 RX ADMIN — DOXYCYCLINE 100 MG: 100 CAPSULE ORAL at 12:21

## 2019-05-17 RX ADMIN — TAZOBACTAM SODIUM AND PIPERACILLIN SODIUM 3.38 G: 375; 3 INJECTION, SOLUTION INTRAVENOUS at 04:23

## 2019-05-17 RX ADMIN — HYDROCODONE BITARTRATE AND ACETAMINOPHEN 1 TABLET: 10; 325 TABLET ORAL at 02:21

## 2019-05-17 RX ADMIN — PREDNISONE 60 MG: 50 TABLET ORAL at 09:13

## 2019-05-17 RX ADMIN — SODIUM CHLORIDE 75 ML/HR: 9 INJECTION, SOLUTION INTRAVENOUS at 02:34

## 2019-05-17 NOTE — NURSING NOTE
Call placed to attending. Patient increasingly restless, anxious and HR elevated 110-120's. Patient states symptoms are similar to when she experienced withdrawal in the past. Discussed with Dinesh. Advised to continue to administer PO pain medication Q4 and monitor for worsening signs and symptoms of withdrawal.

## 2019-05-17 NOTE — DISCHARGE SUMMARY
Patient Name: Grisel Allen  : 1990  MRN: [id number]    Date of Admission: 5/15/2019  Date of Discharge:  2019  Primary Care Physician: Provider, No Known      Chief Complaint:   Fatigue (body pain, dizzy); Addiction Problem (heroin last use 2 days); Difficulty Urinating (x 2 weeks); and Cyst (pus perineal area x 2 weeks)      Discharge Diagnoses     Active Hospital Problems    Diagnosis  POA   • **Cellulitis and abscess of buttock [L02.31, L03.317]  Yes   • Heroin abuse (CMS/[facility]) [F11.10]  Yes   • Community acquired pneumonia of left lung [J18.9]  Yes   • Sepsis associated hypotension (CMS/[facility]) [A41.9, I95.9]  Yes   • Leukocytosis [D72.829]  Yes   • Hypokalemia [E87.6]  Yes   • Hyponatremia [E87.1]  Yes   • Sepsis (CMS/[facility]) [A41.9]  Yes   • Depression [F32.9]  Yes   • ADD (attention deficit disorder) [F98.8]  Yes   • Behcet's syndrome (CMS/[facility]) [M35.2]  Yes      Resolved Hospital Problems   No resolved problems to display.        Hospital Course     Ms. Allen is a 28 y.o. female former smoker with a history of IV drug use who presented to [facility] initially complaining of fevers chills and area of swelling and pain on her buttock.  Please see the admitting history and physical for further details.  She was found to have an abscess and was admitted to the hospital for further evaluation and treatment.  She was admitted to the hospital seen and evaluated by general surgery and taken to the OR for incision and drainage.  She tolerated the procedure well and postoperatively has recovered.  Microbiology from cultures is growing group A strep.  Infectious disease followed the patient and recommended oral Augmentin at discharge.  Her pain is been controlled she denies new issues or complaints she has not rested well and here in the hospital but feels like she will rest better at home.  Incidentally her chest x-ray and her CT scan were both read as a possible multifocal pneumonia however  she has no shortness of breath no cough no other symptoms of pneumonia.  For community-acquired pneumonia the antibiotic she received here in the hospital antibiotics to treat the infection should be enough to cover.  Otherwise at the time of discharge the plan was discussed with the patient at the bedside she is agreeable with discharge home today.        Day of Discharge     HPI:   Denies new issues or complaints pain is controlled    Physical Exam:  Temp:  [97.9 °F (36.6 °C)-98.9 °F (37.2 °C)] 97.9 °F (36.6 °C)  Heart Rate:  [79-91] 79  Resp:  [16] 16  BP: ()/(56-64) 101/64  Body mass index is 27.54 kg/m².  Physical Exam   Constitutional: She is oriented to person, place, and time. She appears well-nourished.   HENT:   Head: Normocephalic and atraumatic.   Cardiovascular: Normal rate, regular rhythm and normal heart sounds.   Pulmonary/Chest: Effort normal and breath sounds normal.   Neurological: She is alert and oriented to person, place, and time.   Psychiatric: She has a normal mood and affect. Her behavior is normal.   Nursing note and vitals reviewed.      Consultants     Consult Orders (all) (From admission, onward)    Start     Ordered    05/17/19 0813  Inpatient Infectious Diseases Consult  Once     Specialty:  Infectious Diseases  Provider:  Bernadine Montiel MD    05/17/19 0813    05/16/19 0155  Inpatient Access Center Consult  Once     Provider:  (Not yet assigned)    05/16/19 0157    05/15/19 1652  [facility] (on-call MD unless specified)  Once     Specialty:  Internal Medicine  Provider:  (Not yet assigned)    05/15/19 1651    05/15/19 1633  Pulmonology (on-call MD unless specified)  Once     Specialty:  Pulmonary Disease  Provider:  (Not yet assigned)    05/15/19 1632    05/15/19 1522  Surgery (on-call MD unless specified)  Once     Specialty:  General Surgery  Provider:  (Not yet assigned)    05/15/19 1521        Consulting Physician(s)     Provider Relationship Specialty    Coy Pavon MD  Consulting Physician General Surgery    Bernadine Montiel MD Consulting Physician Infectious Diseases    Roly Maria MD Consulting Physician Internal Medicine        Procedures     INCISION AND DRAINAGE OF PERIRECTAL ABSCESS    Imaging Results (all)     Procedure Component Value Units Date/Time    CT Chest Without Contrast [[id number]] Collected:  05/16/19 1211     Updated:  05/16/19 1317    Narrative:       CT CHEST WITHOUT IV CONTRAST     HISTORY: Abnormal chest radiograph     TECHNIQUE: Radiation dose reduction techniques were utilized, including  automated exposure control and exposure modulation based on body size.   3 mm images were obtained through the chest without IV contrast.      COMPARISON: CTA chest 08/26/2015 and chest radiograph 05/15/2019     FINDINGS:     There is no mediastinal or axillary adenopathy by size criteria.     The heart is normal in size.     Right basilar atelectasis is present. There is asymmetric groundglass  and pulmonary opacification within the left lower lobe associated  findings of volume loss. There is no pleural effusion or pneumothorax.     Subacute left eighth rib fracture. Older, healed left rib fractures are  present as well.       Impression:       1.  Groundglass and pulmonary opacification within the left lower lobe.  Findings are most suggestive of early pneumonia in the appropriate  clinical context and correlation with patient history is recommended.  2.  Acute to subacute left third through eighth rib fractures.      This report was finalized on 5/16/2019 1:14 PM by Dr. Azar Davis M.D.       XR Chest 2 View [[id number]] Collected:  05/15/19 1608     Updated:  05/16/19 0637    Narrative:       TWO-VIEW CHEST     HISTORY: Cough and fever.     FINDINGS:  The lungs are well-expanded with some patchy pneumonia in the  left perihilar region and continued follow-up evaluation is recommended.  The heart size is normal.     This report was finalized on 5/16/2019 6:34  AM by Dr. Lobo Perez M.D.                     Pertinent Labs     Results from last 7 days   Lab Units 05/17/19 0425 05/16/19  0557 05/15/19  1504   WBC 10*3/mm3 25.87* 21.22* 26.84*   HEMOGLOBIN g/dL 9.7* 9.7* 11.5*   PLATELETS 10*3/mm3 218 191 243     Results from last 7 days   Lab Units 05/17/19 0425 05/16/19  0557 05/15/19  1504   SODIUM mmol/L 145 142 133*   POTASSIUM mmol/L 3.2* 3.6 3.2*   CHLORIDE mmol/L 109* 107 95*   CO2 mmol/L 22.8 24.2 24.8   BUN mg/dL 7 8 10   CREATININE mg/dL 0.58 0.57 0.72   GLUCOSE mg/dL 120* 102* 121*   Estimated Creatinine Clearance: 132 mL/min (by C-G formula based on SCr of 0.58 mg/dL).  Results from last 7 days   Lab Units 05/17/19 0425 05/16/19  0557 05/15/19  1504   ALBUMIN g/dL 3.40* 2.90* 3.90   BILIRUBIN mg/dL  --  0.9 0.8   ALK PHOS U/L  --  64 75   AST (SGOT) U/L  --  13 12   ALT (SGPT) U/L  --  9 10     Results from last 7 days   Lab Units 05/17/19 0425 05/16/19  0557 05/15/19  1504   CALCIUM mg/dL 8.5* 8.0* 8.9   ALBUMIN g/dL 3.40* 2.90* 3.90   MAGNESIUM mg/dL 1.9  --  1.7   PHOSPHORUS mg/dL 2.1*  --   --                Invalid input(s): LDLCALC  Results from last 7 days   Lab Units 05/16/19  1649 05/15/19  1947 05/15/19  1557 05/15/19  1535 05/15/19  1504   BLOODCX   --   --  No growth at 24 hours  --  No growth at 24 hours   RESPCX  Scant growth (1+) Normal Respiratory Tricia  --   --   --   --    WOUNDCX   --  Light growth (2+) Streptococcus pyogenes (Group A)*  --  Light growth (2+) Streptococcus pyogenes (Group A)*  Heavy growth (4+) Normal Skin Tricia  --        Test Results Pending at Discharge   None   Order Current Status    Blood Culture - Blood, Hand, Right Preliminary result    Blood Culture - Blood, Hand, Right Preliminary result    Respiratory Culture - Sputum, Cough Preliminary result          Discharge Details        Discharge Medications      New Medications      Instructions Start Date   amoxicillin-clavulanate 875-125 MG per tablet  Commonly  known as:  AUGMENTIN   1 tablet, Oral, Every 12 Hours Scheduled      doxycycline 100 MG capsule  Commonly known as:  MONODOX   100 mg, Oral, Every 12 Hours Scheduled      predniSONE 20 MG tablet  Commonly known as:  DELTASONE   40 mg, Oral, Daily With Breakfast   Start Date:  5/18/2019        Continue These Medications      Instructions Start Date   ADDERALL PO   10 mg, Oral, 2 Times Daily      XANAX PO   0.5 mg, Oral, 3 Times Daily             No Known Allergies      Discharge Disposition:  Home or Self Care    Discharge Diet:  Diet Order   Procedures   • Diet Regular       Discharge Activity:   Activity Instructions     Activity as Tolerated            CODE STATUS:    Code Status and Medical Interventions:   Ordered at: 05/15/19 2121     Code Status:    CPR     Medical Interventions (Level of Support Prior to Arrest):    Full       No future appointments.  Additional Instructions for the Follow-ups that You Need to Schedule     Discharge Follow-up with PCP   As directed       Currently Documented PCP:    Provider, No Known    PCP Phone Number:    [phone]     Follow Up Details:  4-6 weeks         Discharge Follow-up with Specified Provider: Dr Pavon 1 week   As directed      To:  Dr Pavon 1 week           Follow-up Information     Provider, No Known .    Why:  4-6 weeks  Contact information:  [place] [id number]  [phone]                   Additional Instructions for the Follow-ups that You Need to Schedule     Discharge Follow-up with PCP   As directed       Currently Documented PCP:    Provider, No Known    PCP Phone Number:    [phone]     Follow Up Details:  4-6 weeks         Discharge Follow-up with Specified Provider: Dr Pavon 1 week   As directed      To:  Dr Pavon 1 week           Time Spent on Discharge:  Greater than 30 minutes      Electronically signed by Roly Maria MD, 5/17/2019, 12:18 PM

## 2019-05-17 NOTE — NURSING NOTE
Access Center follow-up.  Upon entering room, patient is sleeping and did not waken when name called.  Per RN patient was up all night r/t RLS and had to walk around the unit frequently to relieve symptoms.  RN stated patient will mostly likely discharge home today.  Patient was provided with a list of resources yesterday.

## 2019-05-17 NOTE — PROGRESS NOTES
"Chief Complaint:    POD 1, S/P I&D of abscess of right buttocks    Subjective:    \"It still hurts\"  Tolerating diet    Objective:    Vitals:    05/15/19 2311 05/16/19 0746 05/16/19 1356 05/16/19 1936   BP: 106/74 108/60 104/56 108/62   BP Location: Left arm Left arm Left arm Left arm   Patient Position: Lying Lying Lying Lying   Pulse: 111 100 91    Resp: 16 16 16 16   Temp: 99.9 °F (37.7 °C) 99 °F (37.2 °C) 98.9 °F (37.2 °C) 98.7 °F (37.1 °C)   TempSrc: Oral Oral Oral Oral   SpO2: 99% 98% 98%    Weight:       Height:           Buttocks incision has less erythema and induration. Seems less tender.   Extremities: Warm    Labs reviewed.  OR culture with Group A Strep    Assessment:    POD 1, S/P I&D of abscess of right buttocks    Plan:    Looks better overall  I don't think that the wound needs to be packed.    "

## 2019-05-17 NOTE — PROGRESS NOTES
Case Management Discharge Note    Final Note: Patient has been Dc'd home         Transportation Services  Private: Car    Final Discharge Disposition Code: 01 - home or self-care

## 2019-05-17 NOTE — PROGRESS NOTES
Discharge Planning Assessment  Psychiatric     Patient Name: Grisel Allen  MRN: [id number]  Today's Date: 5/17/2019    Admit Date: 5/15/2019    Discharge Needs Assessment     Row Name 05/17/19 0851       Living Environment    Lives With  friend(s)    Current Living Arrangements  home/apartment/condo    Primary Care Provided by  self    Provides Primary Care For  no one    Family Caregiver if Needed  friend(s)    Family Caregiver Names  Patient did not give name    Quality of Family Relationships  helpful    Able to Return to Prior Arrangements  yes       Resource/Environmental Concerns    Resource/Environmental Concerns  none       Transition Planning    Patient/Family Anticipates Transition to  home    Patient/Family Anticipated Services at Transition  mental health services    Transportation Anticipated  family or friend will provide       Discharge Needs Assessment    Readmission Within the Last 30 Days  no previous admission in last 30 days    Concerns to be Addressed  denies needs/concerns at this time    Equipment Currently Used at Home  none    Anticipated Changes Related to Illness  none    Equipment Needed After Discharge  none    Outpatient/Agency/Support Group Needs  outpatient substance abuse treatment        Discharge Plan     Row Name 05/17/19 0854       Plan    Plan  Return home with friend.    Patient/Family in Agreement with Plan  yes    Plan Comments  Spoke with patient at bedside.  Patient lives with a friend (she did not give a name), is IADL, uses no DME.  Emergency contact is Lexi landers [phone].  Patient plans to return home at [place].  Access center is following.  CCP will follow as needed.  BHumeniuk RN          Demographic Summary     Row Name 05/17/19 0850       General Information    Admission Type  inpatient    Arrived From  home    Referral Source  admission list    Reason for Consult  discharge planning    Preferred Language  English     Used During This  Interaction  no        Functional Status     Row Name 05/17/19 0850       Functional Status    Usual Activity Tolerance  good    Current Activity Tolerance  good       Functional Status, IADL    Medications  independent    Meal Preparation  independent;assistive person Lives with parents    Housekeeping  independent;assistive person    Laundry  assistive person;independent    Shopping  independent;assistive person       Mental Status    General Appearance WDL  WDL       Mental Status Summary    Recent Changes in Mental Status/Cognitive Functioning  no changes              Becky S. Humeniuk, RN

## 2019-05-17 NOTE — CONSULTS
CONSULT NOTE    Infectious Diseases - Bernadine Tam MD  [facility]       Patient Identification:  Name: Grisel Allen  Age: 28 y.o.  Sex: female  :  1990  MRN: [id number]             Date of Consultation: 2019      Primary Care Physician: Provider, No Known                               Requesting Physician: Dr. Roly Maria  Reason for Consultation: Management recommendations and antibiotic selection for right gluteal abscess and cellulitis status post I&D.    Impression: 28-year-old female with history of substance abuse including intranasal heroine use as well as history of Behcet's disease admitted on 5/15/2019 with right buttock abscess and cellulitis of 24 hours duration and painful ulcers of the lips and mouth.  Patient was evaluated by general surgery service and underwent I&D of the right gluteal abscess on 5/15/2019.  Patient has been treated with appropriate antibiotics since then and her intraoperative cultures are now positive for group A beta-hemolytic strep with improvement in her overall condition.  1-right gluteal abscess with cellulitis due to mixed infection with predominant group B beta-hemolytic strep improved after I&D.  2-history of poly-substance abuse  3-Behcet's disease  4-significant leukocytosis multifactorial and likely the effect of high-dose steroids that she is receiving for underlying autoimmune disease/Behcet's disease.  5-abnormal CT scan of the chest with groundglass appearing the left lung could very well be early community-acquired pneumonia or part of autoimmune process.      Recommendations/Discussions: At this juncture she seems to have improved and her antibiotics can be simplified to oral Augmentin and doxycycline to continue for 10 days of treatment.  Patient can be discharged on oral antibiotic from ID standpoint anytime once cleared by general surgery and internal medicine service and her other issues are addressed and instructions for  wound care of the I&D site in the right gluteal area are given to the patient.  Thank you Dr. Maria for letting me be the part of your patient care please see above impression and recommendations      History of Present Illness:   28-year-old female with history of substance abuse including intranasal heroine use as well as history of Behcet's disease admitted on 5/15/2019 with right buttock abscess and cellulitis of 24 hours duration and painful ulcers of the lips and mouth.  Patient was evaluated by general surgery service and underwent I&D of the right gluteal abscess on 5/15/2019.  Patient has been treated with appropriate antibiotics since then and her intraoperative cultures are now positive for group A beta-hemolytic strep with improvement in her overall condition.      Past Medical History:  Past Medical History:   Diagnosis Date   • ADD (attention deficit disorder)    • Anxiety    • Autoimmune disease (CMS/HCC)    • Behcet's disease (CMS/HCC)    • Behcet's syndrome (CMS/HCC)    • Depression    • Eye exam, routine 2013   • Heroin abuse (CMS/HCC)    • Pap smear, as part of routine gynecological examination 01/2015    NORMAL     Past Surgical History:  Past Surgical History:   Procedure Laterality Date   • BREAST CYST EXCISION  2010   • DECUBITUS ULCER EXCISION     • INCISION AND DRAINAGE PERIRECTAL ABSCESS Right 5/15/2019    Procedure: INCISION AND DRAINAGE OF PERIRECTAL ABSCESS;  Surgeon: Coy Pavon MD;  Location: [facility];  Service: General   • TONSILLECTOMY  2004      Home Meds:  Medications Prior to Admission   Medication Sig Dispense Refill Last Dose   • ALPRAZolam (XANAX PO) Take 0.5 mg by mouth 3 (Three) Times a Day.      • Amphetamine-Dextroamphetamine (ADDERALL PO) Take 10 mg by mouth 2 (Two) Times a Day.        Current Meds:     Current Facility-Administered Medications:   •  acetaminophen (TYLENOL) tablet 650 mg, 650 mg, Oral, Q6H PRN, Rachel Aleman APRN, 650 mg at 05/16/19  0153  •  HYDROcodone-acetaminophen (NORCO)  MG per tablet 1 tablet, 1 tablet, Oral, Q4H PRN, Coy Pavon MD, 1 tablet at 05/17/19 0631  •  HYDROcodone-acetaminophen (NORCO) 5-325 MG per tablet 1 tablet, 1 tablet, Oral, Q4H PRN, Coy Pavon MD  •  lidocaine viscous (XYLOCAINE) 2 % mouth solution 5 mL, 5 mL, Mouth/Throat, TID PRN, Roly Maria MD  •  ondansetron (ZOFRAN) injection 4 mg, 4 mg, Intravenous, Q6H PRN, Coy Pavon MD  •  piperacillin-tazobactam (ZOSYN) 3.375 g in iso-osmotic dextrose 50 ml (premix), 3.375 g, Intravenous, Q8H, Coy Pavon MD, 3.375 g at 05/17/19 0423  •  potassium chloride (KLOR-CON) packet 40 mEq, 40 mEq, Oral, PRN, Tomás Marie MD  •  potassium chloride (MICRO-K) CR capsule 40 mEq, 40 mEq, Oral, PRN, Tomás Marie MD, 40 mEq at 05/17/19 0631  •  predniSONE (DELTASONE) tablet 60 mg, 60 mg, Oral, Daily With Breakfast, Tomás Marie MD, 60 mg at 05/17/19 0913  •  [COMPLETED] Insert peripheral IV, , , Once **AND** sodium chloride 0.9 % flush 10 mL, 10 mL, Intravenous, PRN, Clinton Pena MD  •  sodium chloride 0.9 % infusion, 75 mL/hr, Intravenous, Continuous, Roly Maria MD, Last Rate: 75 mL/hr at 05/17/19 0234, 75 mL/hr at 05/17/19 0234  •  sucralfate (CARAFATE) 1 GM/10ML suspension 1 g, 1 g, Oral, 4x Daily AC & at Bedtime, Tomás Marie MD, 1 g at 05/17/19 0632  Allergies:  No Known Allergies  Social History:   Social History     Tobacco Use   • Smoking status: Former Smoker     Packs/day: 0.50     Years: 5.00     Pack years: 2.50     Types: Cigarettes   • Smokeless tobacco: Never Used   Substance Use Topics   • Alcohol use: Yes     Comment: occ      Family History:  Family History   Problem Relation Age of Onset   • Diabetes Mother           Review of Systems  See history of present illness and past medical history.    Overall much improved.  At the time of admission her review system was as follows:  Constitutional:  "Positive for appetite change, chills and fatigue. Negative for diaphoresis and fever.   HENT: Positive for mouth sores, sore throat and trouble swallowing. Negative for congestion, dental problem, ear pain, facial swelling, hearing loss, nosebleeds, rhinorrhea, sinus pressure, tinnitus and voice change.    Eyes: Negative for pain and visual disturbance.   Respiratory: Positive for cough. Negative for choking, chest tightness, shortness of breath and stridor.    Cardiovascular: Negative for chest pain, palpitations and leg swelling.   Gastrointestinal: Positive for nausea. Negative for abdominal pain, blood in stool, diarrhea and vomiting.   Endocrine: Negative for cold intolerance and heat intolerance.   Genitourinary: Negative for decreased urine volume, difficulty urinating, dysuria and hematuria.   Musculoskeletal: Negative for back pain and neck pain.   Skin: Positive for wound. Negative for color change and pallor.   Allergic/Immunologic: Negative for environmental allergies and food allergies.   Neurological: Negative for tremors, seizures, syncope, facial asymmetry, speech difficulty, light-headedness, numbness and headaches.   Hematological: Negative for adenopathy. Does not bruise/bleed easily.   Psychiatric/Behavioral: Negative for behavioral problems, confusion and hallucinations.         Vitals:   /64 (BP Location: Right arm, Patient Position: Lying)   Pulse 79   Temp 97.9 °F (36.6 °C) (Oral)   Resp 16   Ht 157.5 cm (62\")   Wt 68.3 kg (150 lb 9.2 oz)   LMP 03/16/2019   SpO2 98%   BMI 27.54 kg/m²   I/O:     Intake/Output Summary (Last 24 hours) at 5/17/2019 1016  Last data filed at 5/17/2019 0423  Gross per 24 hour   Intake 1340 ml   Output --   Net 1340 ml     Exam:  General Appearance:    Alert, cooperative, no distress, appears stated age   Head:    Normocephalic, without obvious abnormality, atraumatic   Eyes:    PERRL, conjunctiva/corneas clear, EOM's intact, both eyes   Ears:    Normal " external ear canals, both ears   Nose:   Nares normal, septum midline, mucosa normal, no drainage    or sinus tenderness   Throat:  Crusted lesions on lips, tongue, gums normal; oral mucosa pink and moist   Neck:   Supple, symmetrical, trachea midline, no adenopathy;     thyroid:  no enlargement/tenderness/nodules; no carotid    bruit or JVD   Back:     Symmetric, no curvature, ROM normal, no CVA tenderness   Lungs:     Clear to auscultation bilaterally, respirations unlabored   Chest Wall:    No tenderness or deformity    Heart:    Regular rate and rhythm, S1 and S2 normal, no murmur, rub   or gallop   Abdomen:     Soft, non-tender, bowel sounds active all four quadrants,   Right gluteal area has resolving erythema with minimal induration and surgical I&D site has minimal tenderness but still there.  Overall she is improved.   Extremities:   Extremities normal, atraumatic, no cyanosis or edema   Pulses:   Pulses palpable in all extremities; symmetric all extremities   Skin:   Skin color normal, Skin is warm and dry,  no rashes or palpable lesions   Neurologic:   CNII-XII intact, motor strength grossly intact, sensation grossly intact to light touch, no focal deficits noted       Data Review:    I reviewed the patient's new clinical results.  Results from last 7 days   Lab Units 05/17/19  0425 05/16/19  0557 05/15/19  1504   WBC 10*3/mm3 25.87* 21.22* 26.84*   HEMOGLOBIN g/dL 9.7* 9.7* 11.5*   PLATELETS 10*3/mm3 218 191 243     Results from last 7 days   Lab Units 05/17/19  0425 05/16/19  0557 05/15/19  1504   SODIUM mmol/L 145 142 133*   POTASSIUM mmol/L 3.2* 3.6 3.2*   CHLORIDE mmol/L 109* 107 95*   CO2 mmol/L 22.8 24.2 24.8   BUN mg/dL 7 8 10   CREATININE mg/dL 0.58 0.57 0.72   CALCIUM mg/dL 8.5* 8.0* 8.9   GLUCOSE mg/dL 120* 102* 121*     No results found for: CULTURE]    Assessment:  Active Hospital Problems    Diagnosis  POA   • **Cellulitis and abscess of buttock [L02.31, L03.317]  Yes   • Heroin abuse  (CMS/HCC) [F11.10]  Yes   • Community acquired pneumonia of left lung [J18.9]  Yes   • Sepsis associated hypotension (CMS/HCC) [A41.9, I95.9]  Yes   • Leukocytosis [D72.829]  Yes   • Hypokalemia [E87.6]  Yes   • Hyponatremia [E87.1]  Yes   • Sepsis (CMS/HCC) [A41.9]  Yes   • Depression [F32.9]  Yes   • ADD (attention deficit disorder) [F98.8]  Yes   • Behcet's syndrome (CMS/HCC) [M35.2]  Yes      Resolved Hospital Problems   No resolved problems to display.         Plan:  See orders.  Bernadine Montiel MD   5/17/2019  10:16 AM    Much of this encounter note is an electronic transcription/translation of spoken language to printed text. The electronic translation of spoken language may permit erroneous, or at times, nonsensical words or phrases to be inadvertently transcribed; Although I have reviewed the note for such errors, some may still exist

## 2019-05-18 ENCOUNTER — READMISSION MANAGEMENT (OUTPATIENT)
Dept: CALL CENTER | Facility: HOSPITAL | Age: 29
End: 2019-05-18

## 2019-05-18 LAB
BACTERIA SPEC RESP CULT: NORMAL
GRAM STN SPEC: NORMAL

## 2019-05-18 NOTE — OUTREACH NOTE
Prep Survey      Responses   Facility patient discharged from?  [facility]   Is patient eligible?  Yes   Discharge diagnosis  Cellulitis and abscess of buttock,    Community acquired pneumonia,    Sepsis   Does the patient have one of the following disease processes/diagnoses(primary or secondary)?  Sepsis   Does the patient have Home health ordered?  No   Is there a DME ordered?  No   General alerts for this patient  Heroin abuse   Prep survey completed?  Yes          Zaria Cabrera RN

## 2019-05-20 ENCOUNTER — READMISSION MANAGEMENT (OUTPATIENT)
Dept: CALL CENTER | Facility: HOSPITAL | Age: 29
End: 2019-05-20

## 2019-05-20 LAB
BACTERIA SPEC AEROBE CULT: NORMAL
BACTERIA SPEC AEROBE CULT: NORMAL

## 2019-05-20 NOTE — OUTREACH NOTE
Sepsis Week 1 Survey      Responses   Facility patient discharged from?  [facility]   Does the patient have one of the following disease processes/diagnoses(primary or secondary)?  Sepsis   Is there a successful TCM telephone encounter documented?  No   Week 1 attempt successful?  Yes   Call start time  0934   Call end time  0937   General alerts for this patient  Heroin abuse   Discharge diagnosis  Cellulitis and abscess of buttock,    Community acquired pneumonia,    Sepsis   Meds reviewed with patient/caregiver?  Yes   Is the patient having any side effects they believe may be caused by any medication additions or changes?  No   Does the patient have all medications related to this admission filled (includes all antibiotics, inhalers, nebulizers,steroids,etc.)  Yes   Is the patient taking all medications as directed (includes completed medication regime)?  Yes   Does the patient have a primary care provider?   No   Comments regarding PCP  Patient states that she is in the process of getting a PCP.   Does the patient have an appointment with their PCP within 7 days of discharge?  Yes   Has the patient kept scheduled appointments due by today?  N/A   Has home health visited the patient within 72 hours of discharge?  N/A   Psychosocial issues?  No   Did the patient receive a copy of their discharge instructions?  Yes   Nursing interventions  Reviewed instructions with patient   What is the patient's perception of their health status since discharge?  Improving   Is the patient/caregiver able to teach back Sepsis?  S - Shivering,fever or very cold, E - Extreme pain or generalized discomfort (worst ever,especially abdomen), P - Pale or discolored skin, S - Sleepy, difficult to arouse,confused, I -   I feel like I might die-a feeling of hopelessness, S - Short of breath   Is patient/caregiver able to teach back steps to recovery at home?  Set small, achievable goals for return to baseline health, Rest and regain  strength, Talk about feelings with family/friends, Record milestones and struggles in a journal, Learn about sepsis(sepsis.org), Eat a balanced diet, Exercise as tolerated, Make a list of questions for PCP appoinment   Is the patient/caregiver able to teach back signs and symptoms of worsening condition:  Fever, Hyperthermia, Rapid heart rate (>90), Shortness of breath/rapid respiratory rate, Altered mental status(confusion/coma), Edema, High blood glucose without diabetes   Is the patient/caregiver able to teach back the hierarchy of who to call/visit for symptoms/problems? PCP, Specialist, Home health nurse, Urgent Care, ED, 911  Yes   Week 1 call completed?  Yes          Anita Parrish RN

## 2019-05-22 ENCOUNTER — OFFICE VISIT (OUTPATIENT)
Dept: SURGERY | Facility: CLINIC | Age: 29
End: 2019-05-22

## 2019-05-22 DIAGNOSIS — M35.2 BEHCET'S SYNDROME (HCC): Primary | ICD-10-CM

## 2019-05-22 DIAGNOSIS — L03.317 CELLULITIS AND ABSCESS OF BUTTOCK: ICD-10-CM

## 2019-05-22 DIAGNOSIS — L02.31 CELLULITIS AND ABSCESS OF BUTTOCK: ICD-10-CM

## 2019-05-22 PROCEDURE — 99024 POSTOP FOLLOW-UP VISIT: CPT | Performed by: SURGERY

## 2019-05-22 RX ORDER — AMOXICILLIN AND CLAVULANATE POTASSIUM 875; 125 MG/1; MG/1
1 TABLET, FILM COATED ORAL 2 TIMES DAILY
Qty: 28 TABLET | Refills: 0 | Status: SHIPPED | OUTPATIENT
Start: 2019-05-22 | End: 2019-07-30

## 2019-05-22 RX ORDER — HYDROXYZINE HYDROCHLORIDE 10 MG/1
TABLET, FILM COATED ORAL
Refills: 0 | COMMUNITY
Start: 2019-04-29 | End: 2019-07-30

## 2019-05-28 NOTE — PROGRESS NOTES
CHIEF COMPLAINT:    Buttocks abscess    HISTORY OF PRESENT ILLNESS:    Grisel Allen is a 28 y.o. female who was recently seen in the hospital with an abscess on the inferomedial right buttocks. I performed an incision and drainage on 5/15/2019. Cultures grew Streptococcus pyogenes, but the gram stain was polymicrobial. She was discharged on doxycycline.     EXAM:    Alert. Oriented.  Lungs: Clear.  Heart: Regular.  Abdomen: Soft. No tenderness or distension.  Buttocks: There is still some induration surrounding the incision. There is minimal tenderness.  There is minimal cellulitis. There is no expressible drainage.  Extremities: Warm.    ASSESSMENT:    Right buttocks abscess  Behcet's syndrome     PLAN:    Doxycycline is going to end within a day or two.  I am going to start Augmentin. She will follow-up again with me in 3 weeks for another wound check.

## 2019-05-29 ENCOUNTER — READMISSION MANAGEMENT (OUTPATIENT)
Dept: CALL CENTER | Facility: HOSPITAL | Age: 29
End: 2019-05-29

## 2019-06-06 ENCOUNTER — READMISSION MANAGEMENT (OUTPATIENT)
Dept: CALL CENTER | Facility: HOSPITAL | Age: 29
End: 2019-06-06

## 2019-06-06 NOTE — OUTREACH NOTE
Sepsis Week 3 Survey      Responses   Facility patient discharged from?  [facility]   Does the patient have one of the following disease processes/diagnoses(primary or secondary)?  Sepsis   Week 3 attempt successful?  Yes   Call start time  1722   Rescheduled  Rescheduled-pt requested   Call end time  1722          Fe Ballard RN

## 2019-06-08 ENCOUNTER — READMISSION MANAGEMENT (OUTPATIENT)
Dept: CALL CENTER | Facility: HOSPITAL | Age: 29
End: 2019-06-08

## 2019-06-08 NOTE — OUTREACH NOTE
Sepsis Week 3 Survey      Responses   Facility patient discharged from?  [facility]   Does the patient have one of the following disease processes/diagnoses(primary or secondary)?  Sepsis   Week 3 attempt successful?  No   Rescheduled  Revoked          Lilliana Quijano RN

## 2020-06-23 ENCOUNTER — HOSPITAL ENCOUNTER (EMERGENCY)
Facility: HOSPITAL | Age: 30
Discharge: HOME | End: 2020-06-24
Attending: EMERGENCY MEDICINE | Admitting: EMERGENCY MEDICINE

## 2020-06-23 DIAGNOSIS — N61.0 ACUTE MASTITIS OF RIGHT BREAST: Primary | ICD-10-CM

## 2020-06-23 DIAGNOSIS — F11.93 OPIATE WITHDRAWAL (HCC): ICD-10-CM

## 2020-06-23 LAB
ALBUMIN SERPL-MCNC: 4.6 G/DL (ref 3.5–5.2)
ALBUMIN/GLOB SERPL: 1.1 G/DL
ALP SERPL-CCNC: 76 U/L (ref 39–117)
ALT SERPL W P-5'-P-CCNC: 11 U/L (ref 1–33)
ANION GAP SERPL CALCULATED.3IONS-SCNC: 12.4 MMOL/L (ref 5–15)
AST SERPL-CCNC: 16 U/L (ref 1–32)
B-HCG UR QL: NEGATIVE
BILIRUB SERPL-MCNC: 0.4 MG/DL (ref 0.2–1.2)
BUN BLD-MCNC: 9 MG/DL (ref 6–20)
BUN/CREAT SERPL: 11.8 (ref 7–25)
CALCIUM SPEC-SCNC: 10 MG/DL (ref 8.6–10.5)
CHLORIDE SERPL-SCNC: 97 MMOL/L (ref 98–107)
CO2 SERPL-SCNC: 24.6 MMOL/L (ref 22–29)
CREAT BLD-MCNC: 0.76 MG/DL (ref 0.57–1)
GFR SERPL CREATININE-BSD FRML MDRD: 110 ML/MIN/1.73
GLOBULIN UR ELPH-MCNC: 4.3 GM/DL
GLUCOSE BLD-MCNC: 120 MG/DL (ref 65–99)
HCG SERPL QL: NEGATIVE
HOLD SPECIMEN: NORMAL
HOLD SPECIMEN: NORMAL
POTASSIUM BLD-SCNC: 3.6 MMOL/L (ref 3.5–5.2)
PROT SERPL-MCNC: 8.9 G/DL (ref 6–8.5)
SODIUM BLD-SCNC: 134 MMOL/L (ref 136–145)
WHOLE BLOOD HOLD SPECIMEN: NORMAL
WHOLE BLOOD HOLD SPECIMEN: NORMAL

## 2020-06-23 PROCEDURE — 99284 EMERGENCY DEPT VISIT MOD MDM: CPT

## 2020-06-23 PROCEDURE — 87040 BLOOD CULTURE FOR BACTERIA: CPT | Performed by: PHYSICIAN ASSISTANT

## 2020-06-23 PROCEDURE — 85025 COMPLETE CBC W/AUTO DIFF WBC: CPT | Performed by: PHYSICIAN ASSISTANT

## 2020-06-23 PROCEDURE — 25010000002 DIPHENHYDRAMINE PER 50 MG: Performed by: PHYSICIAN ASSISTANT

## 2020-06-23 PROCEDURE — 85007 BL SMEAR W/DIFF WBC COUNT: CPT | Performed by: PHYSICIAN ASSISTANT

## 2020-06-23 PROCEDURE — 96375 TX/PRO/DX INJ NEW DRUG ADDON: CPT

## 2020-06-23 PROCEDURE — 80053 COMPREHEN METABOLIC PANEL: CPT | Performed by: PHYSICIAN ASSISTANT

## 2020-06-23 PROCEDURE — 25010000002 PROMETHAZINE PER 50 MG: Performed by: PHYSICIAN ASSISTANT

## 2020-06-23 PROCEDURE — 81025 URINE PREGNANCY TEST: CPT | Performed by: PHYSICIAN ASSISTANT

## 2020-06-23 PROCEDURE — 25010000002 KETOROLAC TROMETHAMINE PER 15 MG: Performed by: PHYSICIAN ASSISTANT

## 2020-06-23 PROCEDURE — 83605 ASSAY OF LACTIC ACID: CPT | Performed by: PHYSICIAN ASSISTANT

## 2020-06-23 PROCEDURE — 96376 TX/PRO/DX INJ SAME DRUG ADON: CPT

## 2020-06-23 PROCEDURE — 84703 CHORIONIC GONADOTROPIN ASSAY: CPT | Performed by: PHYSICIAN ASSISTANT

## 2020-06-23 RX ORDER — PROMETHAZINE HYDROCHLORIDE 25 MG/ML
12.5 INJECTION, SOLUTION INTRAMUSCULAR; INTRAVENOUS ONCE
Status: COMPLETED | OUTPATIENT
Start: 2020-06-23 | End: 2020-06-23

## 2020-06-23 RX ORDER — SODIUM CHLORIDE 0.9 % (FLUSH) 0.9 %
10 SYRINGE (ML) INJECTION AS NEEDED
Status: DISCONTINUED | OUTPATIENT
Start: 2020-06-23 | End: 2020-06-24 | Stop reason: HOSPADM

## 2020-06-23 RX ORDER — KETOROLAC TROMETHAMINE 30 MG/ML
30 INJECTION, SOLUTION INTRAMUSCULAR; INTRAVENOUS ONCE
Status: COMPLETED | OUTPATIENT
Start: 2020-06-23 | End: 2020-06-23

## 2020-06-23 RX ORDER — DIPHENHYDRAMINE HYDROCHLORIDE 50 MG/ML
25 INJECTION INTRAMUSCULAR; INTRAVENOUS ONCE
Status: COMPLETED | OUTPATIENT
Start: 2020-06-23 | End: 2020-06-23

## 2020-06-23 RX ADMIN — SODIUM CHLORIDE 1000 ML: 9 INJECTION, SOLUTION INTRAVENOUS at 23:08

## 2020-06-23 RX ADMIN — DIPHENHYDRAMINE HYDROCHLORIDE 25 MG: 50 INJECTION, SOLUTION INTRAMUSCULAR; INTRAVENOUS at 22:34

## 2020-06-23 RX ADMIN — KETOROLAC TROMETHAMINE 30 MG: 30 INJECTION, SOLUTION INTRAMUSCULAR at 23:08

## 2020-06-23 RX ADMIN — PROMETHAZINE HYDROCHLORIDE 12.5 MG: 25 INJECTION INTRAMUSCULAR; INTRAVENOUS at 22:32

## 2020-06-24 VITALS
OXYGEN SATURATION: 95 % | HEART RATE: 78 BPM | SYSTOLIC BLOOD PRESSURE: 104 MMHG | TEMPERATURE: 99.3 F | BODY MASS INDEX: 29.44 KG/M2 | WEIGHT: 160 LBS | HEIGHT: 62 IN | RESPIRATION RATE: 18 BRPM | DIASTOLIC BLOOD PRESSURE: 57 MMHG

## 2020-06-24 LAB
D-LACTATE SERPL-SCNC: 1.2 MMOL/L (ref 0.5–2)
DEPRECATED RDW RBC AUTO: 59.4 FL (ref 37–54)
ERYTHROCYTE [DISTWIDTH] IN BLOOD BY AUTOMATED COUNT: 16.4 % (ref 12.3–15.4)
HCT VFR BLD AUTO: 35.9 % (ref 34–46.6)
HGB BLD-MCNC: 12.2 G/DL (ref 12–15.9)
LYMPHOCYTES # BLD MANUAL: 2.08 10*3/MM3 (ref 0.7–3.1)
LYMPHOCYTES NFR BLD MANUAL: 17 % (ref 5–12)
LYMPHOCYTES NFR BLD MANUAL: 18 % (ref 19.6–45.3)
MCH RBC QN AUTO: 33.2 PG (ref 26.6–33)
MCHC RBC AUTO-ENTMCNC: 34 G/DL (ref 31.5–35.7)
MCV RBC AUTO: 97.8 FL (ref 79–97)
MONOCYTES # BLD AUTO: 1.97 10*3/MM3 (ref 0.1–0.9)
NEUTROPHILS # BLD AUTO: 7.51 10*3/MM3 (ref 1.7–7)
NEUTROPHILS NFR BLD MANUAL: 65 % (ref 42.7–76)
PLAT MORPH BLD: NORMAL
PLATELET # BLD AUTO: 362 10*3/MM3 (ref 140–450)
PMV BLD AUTO: 10.1 FL (ref 6–12)
RBC # BLD AUTO: 3.67 10*6/MM3 (ref 3.77–5.28)
RBC MORPH BLD: NORMAL
WBC MORPH BLD: NORMAL
WBC NRBC COR # BLD: 11.56 10*3/MM3 (ref 3.4–10.8)

## 2020-06-24 PROCEDURE — 96365 THER/PROPH/DIAG IV INF INIT: CPT

## 2020-06-24 PROCEDURE — 25010000002 CEFTRIAXONE PER 250 MG: Performed by: PHYSICIAN ASSISTANT

## 2020-06-24 PROCEDURE — 96367 TX/PROPH/DG ADDL SEQ IV INF: CPT

## 2020-06-24 RX ORDER — CEFTRIAXONE SODIUM 1 G/50ML
1 INJECTION, SOLUTION INTRAVENOUS ONCE
Status: COMPLETED | OUTPATIENT
Start: 2020-06-24 | End: 2020-06-24

## 2020-06-24 RX ORDER — PROMETHAZINE HYDROCHLORIDE 25 MG/1
25 TABLET ORAL EVERY 6 HOURS PRN
Qty: 21 TABLET | Refills: 0 | Status: SHIPPED | OUTPATIENT
Start: 2020-06-24

## 2020-06-24 RX ORDER — DOXYCYCLINE 100 MG/1
100 CAPSULE ORAL 2 TIMES DAILY
Qty: 20 CAPSULE | Refills: 0 | Status: SHIPPED | OUTPATIENT
Start: 2020-06-24 | End: 2020-07-04

## 2020-06-24 RX ORDER — CEPHALEXIN 500 MG/1
500 CAPSULE ORAL 3 TIMES DAILY
Qty: 30 CAPSULE | Refills: 0 | Status: SHIPPED | OUTPATIENT
Start: 2020-06-24

## 2020-06-24 RX ADMIN — DOXYCYCLINE 100 MG: 100 INJECTION, POWDER, LYOPHILIZED, FOR SOLUTION INTRAVENOUS at 00:57

## 2020-06-24 RX ADMIN — CEFTRIAXONE SODIUM 1 G: 1 INJECTION, SOLUTION INTRAVENOUS at 00:28

## 2020-06-24 NOTE — ED PROVIDER NOTES
Pt presents to the ED c/o  right breast rash for 3 days.  States that she developed pain and redness to her right breast along with subjective fever and chills.  She is also polysubstance abuser and she last used heroin approximately 24 hours ago and developed nausea and vomiting.  While she used methamphetamines this morning.  Currently patient is sleeping and states that she feels better.     On exam,   Her heart is regular rate and rhythm without any murmurs.  Her lungs are clear to auscultation bilaterally.  Breast exam was deferred to the midlevel provider.  Abdomen is normoactive bowel sounds, soft, nontender nondistended.     Plan: I agree with plan of treating patient cellulitis with antibiotics to cover MRSA.  Rechecking baseline blood work to make sure that we can treat this as an outpatient.     Attestation:  The ROSALIND and I have discussed this patient's history, physical exam, and treatment plan.  I have reviewed the documentation and personally had a face to face interaction with the patient. I affirm the documentation and agree with the treatment and plan.  The attached note describes my personal findings.     Dragon disclaimer:   Much of this encounter note is an electronic transcription/translation of spoken language to printed text.  The electronic translation of spoken language may permit erroneous, or at times, nonsensical words or phrases to be inadvertently transcribed.  Although I have reviewed the note for such areas, some may still exist.     Reggie Dave MD  06/24/20 [id number]

## 2020-06-24 NOTE — ED TRIAGE NOTES
Pt arrived to ED via [facility] from [facility] due withdrawal. Pt states she uses ice & heroin, last used at 0400 Temp 99.3 cyst on R breast that causes pain.  .All triage with this RN wearing PPE. Pt placed in mask.

## 2020-06-24 NOTE — ED PROVIDER NOTES
EMERGENCY DEPARTMENT ENCOUNTER    Room Number:  05/05  Date of encounter:  6/24/2020  PCP: Provider, No Known  Historian: Patient      HPI:  Chief Complaint: Right breast cellulitis, opioid withdrawal.  A complete HPI/ROS/PMH/PSH/SH/FH are unobtainable due to: Nothing    Context: Grisel Allen is a 29 y.o. female who presents to the ED c/o right breast cellulitis is progressively worsening over the past 3 days.  Patient is also complaining of opioid withdrawal.  She states she last used heroin approximately 24 hours ago.  She denies associated nausea and vomiting but states she has had fever and chills over the past evening.  Her last methamphetamine use was this morning.  She states she is not suicidal at this time and is put together good periods of sobriety in the past.  She states she plans to follow-up for support and help in beating her opioid addiction.  Her LNMP is unknown at this time.  He is not currently taking any medications.      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/2016   • Autoimmune disease (CMS/Formerly [facility])    • Depression    • Sepsis (CMS/Formerly [facility]) 05/15/2019   • Heroin abuse (CMS/Formerly [facility]) 05/16/2019   • Cellulitis and abscess of buttock 05/16/2019   • Community acquired pneumonia of left lung 05/16/2019   • Sepsis associated hypotension (CMS/Formerly [facility]) 05/16/2019   • Leukocytosis 05/16/2019   • Hypokalemia 05/16/2019   • Hyponatremia 05/16/2019     Resolved Ambulatory Problems     Diagnosis Date Noted   • No Resolved Ambulatory Problems     Past Medical History:   Diagnosis Date   • Behcet's disease (CMS/Formerly [facility])    • Eye exam, routine 2013   • Pap smear, as part of routine gynecological examination 01/2015         PAST SURGICAL HISTORY  Past Surgical History:   Procedure Laterality Date   • BREAST CYST EXCISION  2010   • DECUBITUS ULCER EXCISION     • INCISION AND DRAINAGE  PERIRECTAL ABSCESS Right 5/15/2019    Procedure: INCISION AND DRAINAGE OF PERIRECTAL ABSCESS;  Surgeon: Coy Pavon MD;  Location: [facility] OR;  Service: General   • TONSILLECTOMY  2004         FAMILY HISTORY  Family History   Problem Relation Age of Onset   • Diabetes Mother          SOCIAL HISTORY  Social History     Socioeconomic History   • Marital status: Single     Spouse name: Not on file   • Number of children: Not on file   • Years of education: Not on file   • Highest education level: Not on file   Tobacco Use   • Smoking status: Former Smoker     Packs/day: 0.50     Years: 5.00     Pack years: 2.50     Types: Cigarettes   • Smokeless tobacco: Never Used   Substance and Sexual Activity   • Alcohol use: Yes     Comment: occ   • Drug use: No   • Sexual activity: Defer         ALLERGIES  Patient has no known allergies.        REVIEW OF SYSTEMS  Review of Systems     All systems reviewed and negative except for those discussed in HPI.       PHYSICAL EXAM    I have reviewed the triage vital signs and nursing notes.    ED Triage Vitals [06/23/20 2130]   Temp Heart Rate Resp BP SpO2   99.3 °F (37.4 °C) 95 18 107/56 98 %      Temp src Heart Rate Source Patient Position BP Location FiO2 (%)   -- -- -- -- --       Physical Exam  GENERAL: WDWN female, nontoxic-appearing, in mild distress secondary to withdrawal  HENT: nares patent, mucous membranes moist, NCAT  EYES: no scleral icterus, PERRL  CV: regular rhythm, regular rate, heart sounds normal no rubs murmurs gallops noted  RESPIRATORY: normal effort and lungs CTA B  ABDOMEN: soft, nontender  MUSCULOSKELETAL: no deformity  NEURO: alert, moves all extremities, follows commands, face symmetrical, speech normal, no focal neuro deficits  SKIN: 6 cm area of erythema and induration upper right breast, no abscess or central fluctuance noted.        LAB RESULTS  Recent Results (from the past 24 hour(s))   Light Blue Top    Collection Time: 06/23/20 10:06 PM    Result Value Ref Range    Extra Tube hold for add-on    Green Top (Gel)    Collection Time: 06/23/20 10:06 PM   Result Value Ref Range    Extra Tube Hold for add-ons.    Lavender Top    Collection Time: 06/23/20 10:06 PM   Result Value Ref Range    Extra Tube hold for add-on    Gold Top - SST    Collection Time: 06/23/20 10:06 PM   Result Value Ref Range    Extra Tube Hold for add-ons.    Comprehensive Metabolic Panel    Collection Time: 06/23/20 10:06 PM   Result Value Ref Range    Glucose 120 (H) 65 - 99 mg/dL    BUN 9 6 - 20 mg/dL    Creatinine 0.76 0.57 - 1.00 mg/dL    Sodium 134 (L) 136 - 145 mmol/L    Potassium 3.6 3.5 - 5.2 mmol/L    Chloride 97 (L) 98 - 107 mmol/L    CO2 24.6 22.0 - 29.0 mmol/L    Calcium 10.0 8.6 - 10.5 mg/dL    Total Protein 8.9 (H) 6.0 - 8.5 g/dL    Albumin 4.60 3.50 - 5.20 g/dL    ALT (SGPT) 11 1 - 33 U/L    AST (SGOT) 16 1 - 32 U/L    Alkaline Phosphatase 76 39 - 117 U/L    Total Bilirubin 0.4 0.2 - 1.2 mg/dL    eGFR  African Amer 110 >60 mL/min/1.73    Globulin 4.3 gm/dL    A/G Ratio 1.1 g/dL    BUN/Creatinine Ratio 11.8 7.0 - 25.0    Anion Gap 12.4 5.0 - 15.0 mmol/L   hCG, Serum, Qualitative    Collection Time: 06/23/20 10:06 PM   Result Value Ref Range    HCG Qualitative Negative Negative   CBC Auto Differential    Collection Time: 06/23/20 10:06 PM   Result Value Ref Range    WBC 11.56 (H) 3.40 - 10.80 10*3/mm3    RBC 3.67 (L) 3.77 - 5.28 10*6/mm3    Hemoglobin 12.2 12.0 - 15.9 g/dL    Hematocrit 35.9 34.0 - 46.6 %    MCV 97.8 (H) 79.0 - 97.0 fL    MCH 33.2 (H) 26.6 - 33.0 pg    MCHC 34.0 31.5 - 35.7 g/dL    RDW 16.4 (H) 12.3 - 15.4 %    RDW-SD 59.4 (H) 37.0 - 54.0 fl    MPV 10.1 6.0 - 12.0 fL    Platelets 362 140 - 450 10*3/mm3   Manual Differential    Collection Time: 06/23/20 10:06 PM   Result Value Ref Range    Neutrophil % 65.0 42.7 - 76.0 %    Lymphocyte % 18.0 (L) 19.6 - 45.3 %    Monocyte % 17.0 (H) 5.0 - 12.0 %    Neutrophils Absolute 7.51 (H) 1.70 - 7.00 10*3/mm3     Lymphocytes Absolute 2.08 0.70 - 3.10 10*3/mm3    Monocytes Absolute 1.97 (H) 0.10 - 0.90 10*3/mm3    RBC Morphology Normal Normal    WBC Morphology Normal Normal    Platelet Morphology Normal Normal   Pregnancy, Urine - Urine, Clean Catch    Collection Time: 06/23/20 10:17 PM   Result Value Ref Range    HCG, Urine QL Negative Negative   Lactic Acid, Plasma    Collection Time: 06/23/20 11:37 PM   Result Value Ref Range    Lactate 1.2 0.5 - 2.0 mmol/L       Ordered the above labs and independently reviewed the results.        RADIOLOGY  No Radiology Exams Resulted Within Past 24 Hours    I ordered the above noted radiological studies. Reviewed by me and discussed with radiologist.  See dictation for official radiology interpretation.      PROCEDURES    Procedures      MEDICATIONS GIVEN IN ER    Medications   sodium chloride 0.9 % bolus 1,000 mL (0 mL Intravenous Stopped 6/24/20 0243)   promethazine (PHENERGAN) injection 12.5 mg (12.5 mg Intravenous Given 6/23/20 2232)   diphenhydrAMINE (BENADRYL) injection 25 mg (25 mg Intravenous Given 6/23/20 2234)   ketorolac (TORADOL) injection 30 mg (30 mg Intravenous Given 6/23/20 2308)   doxycycline (VIBRAMYCIN) 100 mg/100 mL 0.9% NS MBP (0 mg Intravenous Stopped 6/24/20 0243)   cefTRIAXone (ROCEPHIN) IVPB 1 g (0 g Intravenous Stopped 6/24/20 0057)         PROGRESS, DATA ANALYSIS, CONSULTS, AND MEDICAL DECISION MAKING    All labs have been independently reviewed by me.  All radiology studies have been reviewed by me and discussed with radiologist dictating the report.   EKG's independently viewed and interpreted by me.  Discussion below represents my analysis of pertinent findings related to patient's condition, differential diagnosis, treatment plan and final disposition.    Differential diagnosis for the right breast pain: Right breast cellulitis, right breast abscess, irritant dermatitis, inflammatory ductal carcinoma.  Given labs history and physical exam this is clearly a  right breast cellulitis/mastitis.    Differential diagnosis for the withdrawal: Opioid withdrawal, methamphetamine withdrawal, alcohol withdrawal.  Per the patient history and clinical presentation she appears to be an opioid withdrawal at this time.    Have discussed the patient with Dr. Dave my supervising physician.  We will treat the mastitis with both doxycycline and Keflex given history of IVD use to ensure we both cover MRSA, MSSA, and strep.  We will treat the opioid withdrawal with Phenergan every 8 hours as needed.  We will have the patient return to the ER with any further, should her condition worsen, or should she develop a fever greater than 101.  We will also give her referrals for further help with her polysubstance abuse.       Prior to seeing patient I performed extensive hand washing, saw to it that the patient was wearing a face mask.  Before entering into the room I wore gloves, glasses, and a face mask.  Prior to leaving the room I doffed my gear and performed handwashing.    AS OF 07:22 VITALS:    BP - 104/57  HR - 78  TEMP - 99.3 °F (37.4 °C)  O2 SATS - 95%        DIAGNOSIS  Final diagnoses:   Acute mastitis of right breast   Opiate withdrawal (CMS/HCC)         DISPOSITION  DISCHARGE    Patient discharged in stable condition.    Reviewed implications of results, diagnosis, meds, responsibility to follow up, warning signs and symptoms of possible worsening, potential complications and reasons to return to ER.    Patient/Family voiced understanding of above instructions.    Discussed plan for discharge, as there is no emergent indication for admission. Patient referred to primary care provider for BP management due to today's BP. Pt/family is agreeable and understands need for follow up and repeat testing.  Pt is aware that discharge does not mean that nothing is wrong but it indicates no emergency is present that requires admission and they must continue care with follow-up as given below or  physician of their choice.     FOLLOW-UP  [facility]  [address]  [facility] [address] [phone]  Schedule an appointment as soon as possible for a visit   For further evaluation and treatment    Kanu Gordon MD  [address]  [address]  [facility] [id number] [phone]    Schedule an appointment as soon as possible for a visit   For further evaluation and treatment    [facility] [address]  [address] [facility] [address]  [facility] [address] [phone]  Schedule an appointment as soon as possible for a visit   For further evaluation and treatment         Medication List      New Prescriptions    doxycycline 100 MG capsule  Commonly known as:  MONODOX  Take 1 capsule by mouth 2 (Two) Times a Day for 10 days.     promethazine 25 MG tablet  Commonly known as:  PHENERGAN  Take 1 tablet by mouth Every 6 (Six) Hours As Needed for Nausea or   Vomiting.        Changed    * cephALEXin 250 MG/5ML suspension  Commonly known as:  KEFLEX  Take 10 mL by mouth 2 (Two) Times a Day.  What changed:  Another medication with the same name was added. Make sure   you understand how and when to take each.     * cephalexin 500 MG capsule  Commonly known as:  KEFLEX  Take 1 capsule by mouth 3 (Three) Times a Day.  What changed:  You were already taking a medication with the same name,   and this prescription was added. Make sure you understand how and when to   take each.         * This list has 2 medication(s) that are the same as other medications   prescribed for you. Read the directions carefully, and ask your doctor or   other care provider to review them with you.                     Vernon Curtis III, PA  06/24/20 [id number]

## 2020-06-24 NOTE — DISCHARGE INSTRUCTIONS
Return to the ER with any further concerns, should your condition change/worsen, or should you develop a fever.  Recommend following up at the [place] or  [facility] above if you need further help with substance abuse.

## 2020-06-28 LAB
BACTERIA SPEC AEROBE CULT: NORMAL
BACTERIA SPEC AEROBE CULT: NORMAL

## 2020-11-27 ENCOUNTER — HOSPITAL ENCOUNTER (EMERGENCY)
Facility: HOSPITAL | Age: 30
Discharge: HOME | End: 2020-11-27
Attending: EMERGENCY MEDICINE | Admitting: EMERGENCY MEDICINE

## 2020-11-27 VITALS
TEMPERATURE: 98.5 F | WEIGHT: 134.5 LBS | SYSTOLIC BLOOD PRESSURE: 95 MMHG | BODY MASS INDEX: 24.75 KG/M2 | DIASTOLIC BLOOD PRESSURE: 68 MMHG | HEIGHT: 62 IN | RESPIRATION RATE: 18 BRPM | HEART RATE: 70 BPM | OXYGEN SATURATION: 100 %

## 2020-11-27 DIAGNOSIS — M35.2 BEHCET'S SYNDROME (HCC): ICD-10-CM

## 2020-11-27 DIAGNOSIS — K12.0 ORAL APHTHOUS ULCER: Primary | ICD-10-CM

## 2020-11-27 LAB
ALBUMIN SERPL-MCNC: 4.2 G/DL (ref 3.5–5.2)
ALBUMIN/GLOB SERPL: 1 G/DL
ALP SERPL-CCNC: 78 U/L (ref 39–117)
ALT SERPL W P-5'-P-CCNC: 19 U/L (ref 1–33)
ANION GAP SERPL CALCULATED.3IONS-SCNC: 11.8 MMOL/L (ref 5–15)
AST SERPL-CCNC: 18 U/L (ref 1–32)
BASOPHILS # BLD AUTO: 0.04 10*3/MM3 (ref 0–0.2)
BASOPHILS NFR BLD AUTO: 0.3 % (ref 0–1.5)
BILIRUB SERPL-MCNC: 0.4 MG/DL (ref 0–1.2)
BUN SERPL-MCNC: 6 MG/DL (ref 6–20)
BUN/CREAT SERPL: 9.4 (ref 7–25)
CALCIUM SPEC-SCNC: 9.3 MG/DL (ref 8.6–10.5)
CHLORIDE SERPL-SCNC: 97 MMOL/L (ref 98–107)
CO2 SERPL-SCNC: 29.2 MMOL/L (ref 22–29)
CREAT SERPL-MCNC: 0.64 MG/DL (ref 0.57–1)
DEPRECATED RDW RBC AUTO: 57.7 FL (ref 37–54)
EOSINOPHIL # BLD AUTO: 0.54 10*3/MM3 (ref 0–0.4)
EOSINOPHIL NFR BLD AUTO: 4.7 % (ref 0.3–6.2)
ERYTHROCYTE [DISTWIDTH] IN BLOOD BY AUTOMATED COUNT: 16.3 % (ref 12.3–15.4)
GFR SERPL CREATININE-BSD FRML MDRD: 133 ML/MIN/1.73
GLOBULIN UR ELPH-MCNC: 4.1 GM/DL
GLUCOSE SERPL-MCNC: 95 MG/DL (ref 65–99)
HCT VFR BLD AUTO: 36.2 % (ref 34–46.6)
HGB BLD-MCNC: 11.9 G/DL (ref 12–15.9)
HOLD SPECIMEN: NORMAL
HOLD SPECIMEN: NORMAL
IMM GRANULOCYTES # BLD AUTO: 0.06 10*3/MM3 (ref 0–0.05)
IMM GRANULOCYTES NFR BLD AUTO: 0.5 % (ref 0–0.5)
LYMPHOCYTES # BLD AUTO: 1.75 10*3/MM3 (ref 0.7–3.1)
LYMPHOCYTES NFR BLD AUTO: 15.1 % (ref 19.6–45.3)
MCH RBC QN AUTO: 32.2 PG (ref 26.6–33)
MCHC RBC AUTO-ENTMCNC: 32.9 G/DL (ref 31.5–35.7)
MCV RBC AUTO: 97.8 FL (ref 79–97)
MONOCYTES # BLD AUTO: 1.9 10*3/MM3 (ref 0.1–0.9)
MONOCYTES NFR BLD AUTO: 16.4 % (ref 5–12)
NEUTROPHILS NFR BLD AUTO: 63 % (ref 42.7–76)
NEUTROPHILS NFR BLD AUTO: 7.29 10*3/MM3 (ref 1.7–7)
NRBC BLD AUTO-RTO: 0 /100 WBC (ref 0–0.2)
PLATELET # BLD AUTO: 397 10*3/MM3 (ref 140–450)
PMV BLD AUTO: 9.4 FL (ref 6–12)
POTASSIUM SERPL-SCNC: 4 MMOL/L (ref 3.5–5.2)
PROT SERPL-MCNC: 8.3 G/DL (ref 6–8.5)
RBC # BLD AUTO: 3.7 10*6/MM3 (ref 3.77–5.28)
SODIUM SERPL-SCNC: 138 MMOL/L (ref 136–145)
WBC # BLD AUTO: 11.58 10*3/MM3 (ref 3.4–10.8)
WHOLE BLOOD HOLD SPECIMEN: NORMAL
WHOLE BLOOD HOLD SPECIMEN: NORMAL

## 2020-11-27 PROCEDURE — 63710000001 PREDNISOLONE PER 5 MG: Performed by: EMERGENCY MEDICINE

## 2020-11-27 PROCEDURE — 96360 HYDRATION IV INFUSION INIT: CPT

## 2020-11-27 PROCEDURE — 85025 COMPLETE CBC W/AUTO DIFF WBC: CPT | Performed by: EMERGENCY MEDICINE

## 2020-11-27 PROCEDURE — 99284 EMERGENCY DEPT VISIT MOD MDM: CPT

## 2020-11-27 PROCEDURE — 80053 COMPREHEN METABOLIC PANEL: CPT | Performed by: EMERGENCY MEDICINE

## 2020-11-27 PROCEDURE — 96361 HYDRATE IV INFUSION ADD-ON: CPT

## 2020-11-27 RX ORDER — PREDNISOLONE SODIUM PHOSPHATE 15 MG/5ML
40 SOLUTION ORAL DAILY
Qty: 66.5 ML | Refills: 0 | Status: SHIPPED | OUTPATIENT
Start: 2020-11-27 | End: 2020-12-02

## 2020-11-27 RX ORDER — LIDOCAINE HYDROCHLORIDE 20 MG/ML
15 SOLUTION OROPHARYNGEAL ONCE
Status: COMPLETED | OUTPATIENT
Start: 2020-11-27 | End: 2020-11-27

## 2020-11-27 RX ORDER — PREDNISOLONE SODIUM PHOSPHATE 15 MG/5ML
1 SOLUTION ORAL ONCE
Status: COMPLETED | OUTPATIENT
Start: 2020-11-27 | End: 2020-11-27

## 2020-11-27 RX ORDER — METRONIDAZOLE 500 MG/1
500 TABLET ORAL 3 TIMES DAILY
COMMUNITY

## 2020-11-27 RX ORDER — ALUMINA, MAGNESIA, AND SIMETHICONE 2400; 2400; 240 MG/30ML; MG/30ML; MG/30ML
15 SUSPENSION ORAL ONCE
Status: COMPLETED | OUTPATIENT
Start: 2020-11-27 | End: 2020-11-27

## 2020-11-27 RX ORDER — COLCHICINE 0.6 MG/1
0.6 TABLET ORAL 2 TIMES DAILY
Qty: 6 TABLET | Refills: 0 | Status: SHIPPED | OUTPATIENT
Start: 2020-11-27 | End: 2020-11-30

## 2020-11-27 RX ADMIN — SODIUM CHLORIDE 1000 ML: 9 INJECTION, SOLUTION INTRAVENOUS at 13:09

## 2020-11-27 RX ADMIN — LIDOCAINE HYDROCHLORIDE 15 ML: 20 SOLUTION ORAL; TOPICAL at 13:06

## 2020-11-27 RX ADMIN — PREDNISOLONE SODIUM PHOSPHATE 60.99 MG: 15 SOLUTION ORAL at 14:15

## 2020-11-27 RX ADMIN — NYSTATIN 500000 UNITS: 100000 SUSPENSION ORAL at 13:04

## 2020-11-27 RX ADMIN — SODIUM CHLORIDE, POTASSIUM CHLORIDE, SODIUM LACTATE AND CALCIUM CHLORIDE 1000 ML: 600; 310; 30; 20 INJECTION, SOLUTION INTRAVENOUS at 13:52

## 2020-11-27 RX ADMIN — ALUMINUM HYDROXIDE, MAGNESIUM HYDROXIDE, AND DIMETHICONE 15 ML: 400; 400; 40 SUSPENSION ORAL at 13:06

## 2021-11-06 ENCOUNTER — HOSPITAL ENCOUNTER (EMERGENCY)
Facility: HOSPITAL | Age: 31
Discharge: HOME | End: 2021-11-06
Attending: EMERGENCY MEDICINE | Admitting: EMERGENCY MEDICINE

## 2021-11-06 ENCOUNTER — APPOINTMENT (OUTPATIENT)
Dept: ULTRASOUND IMAGING | Facility: HOSPITAL | Age: 31
End: 2021-11-06

## 2021-11-06 VITALS
BODY MASS INDEX: 29.44 KG/M2 | HEART RATE: 95 BPM | TEMPERATURE: 98.4 F | OXYGEN SATURATION: 96 % | HEIGHT: 62 IN | SYSTOLIC BLOOD PRESSURE: 111 MMHG | DIASTOLIC BLOOD PRESSURE: 70 MMHG | RESPIRATION RATE: 16 BRPM | WEIGHT: 160 LBS

## 2021-11-06 DIAGNOSIS — O20.9 VAGINAL BLEEDING IN PREGNANCY, FIRST TRIMESTER: Primary | ICD-10-CM

## 2021-11-06 LAB
ABO GROUP BLD: NORMAL
ALBUMIN SERPL-MCNC: 3.9 G/DL (ref 3.5–5.2)
ALBUMIN/GLOB SERPL: 1.3 G/DL
ALP SERPL-CCNC: 77 U/L (ref 39–117)
ALT SERPL W P-5'-P-CCNC: 16 U/L (ref 1–33)
ANION GAP SERPL CALCULATED.3IONS-SCNC: 6.7 MMOL/L (ref 5–15)
AST SERPL-CCNC: 12 U/L (ref 1–32)
BASOPHILS # BLD AUTO: 0.06 10*3/MM3 (ref 0–0.2)
BASOPHILS NFR BLD AUTO: 0.5 % (ref 0–1.5)
BILIRUB SERPL-MCNC: <0.2 MG/DL (ref 0–1.2)
BILIRUB UR QL STRIP: NEGATIVE
BUN SERPL-MCNC: 15 MG/DL (ref 6–20)
BUN/CREAT SERPL: 27.8 (ref 7–25)
CALCIUM SPEC-SCNC: 8.9 MG/DL (ref 8.6–10.5)
CHLORIDE SERPL-SCNC: 104 MMOL/L (ref 98–107)
CLARITY UR: CLEAR
CO2 SERPL-SCNC: 25.3 MMOL/L (ref 22–29)
COLOR UR: YELLOW
CREAT SERPL-MCNC: 0.54 MG/DL (ref 0.57–1)
DEPRECATED RDW RBC AUTO: 61.2 FL (ref 37–54)
EOSINOPHIL # BLD AUTO: 0.13 10*3/MM3 (ref 0–0.4)
EOSINOPHIL NFR BLD AUTO: 1.1 % (ref 0.3–6.2)
ERYTHROCYTE [DISTWIDTH] IN BLOOD BY AUTOMATED COUNT: 17.6 % (ref 12.3–15.4)
GFR SERPL CREATININE-BSD FRML MDRD: >150 ML/MIN/1.73
GLOBULIN UR ELPH-MCNC: 2.9 GM/DL
GLUCOSE SERPL-MCNC: 78 MG/DL (ref 65–99)
GLUCOSE UR STRIP-MCNC: NEGATIVE MG/DL
HCG INTACT+B SERPL-ACNC: 5611 MIU/ML
HCT VFR BLD AUTO: 33.1 % (ref 34–46.6)
HGB BLD-MCNC: 11.2 G/DL (ref 12–15.9)
HGB UR QL STRIP.AUTO: NEGATIVE
IMM GRANULOCYTES # BLD AUTO: 0.31 10*3/MM3 (ref 0–0.05)
IMM GRANULOCYTES NFR BLD AUTO: 2.6 % (ref 0–0.5)
KETONES UR QL STRIP: NEGATIVE
LEUKOCYTE ESTERASE UR QL STRIP.AUTO: NEGATIVE
LYMPHOCYTES # BLD AUTO: 3.55 10*3/MM3 (ref 0.7–3.1)
LYMPHOCYTES NFR BLD AUTO: 29.3 % (ref 19.6–45.3)
MCH RBC QN AUTO: 32.6 PG (ref 26.6–33)
MCHC RBC AUTO-ENTMCNC: 33.8 G/DL (ref 31.5–35.7)
MCV RBC AUTO: 96.2 FL (ref 79–97)
MONOCYTES # BLD AUTO: 1.65 10*3/MM3 (ref 0.1–0.9)
MONOCYTES NFR BLD AUTO: 13.6 % (ref 5–12)
NEUTROPHILS NFR BLD AUTO: 52.9 % (ref 42.7–76)
NEUTROPHILS NFR BLD AUTO: 6.41 10*3/MM3 (ref 1.7–7)
NITRITE UR QL STRIP: NEGATIVE
NRBC BLD AUTO-RTO: 0.1 /100 WBC (ref 0–0.2)
PH UR STRIP.AUTO: 6 [PH] (ref 5–8)
PLATELET # BLD AUTO: 299 10*3/MM3 (ref 140–450)
PMV BLD AUTO: 9.3 FL (ref 6–12)
POTASSIUM SERPL-SCNC: 3.8 MMOL/L (ref 3.5–5.2)
PROT SERPL-MCNC: 6.8 G/DL (ref 6–8.5)
PROT UR QL STRIP: NEGATIVE
RBC # BLD AUTO: 3.44 10*6/MM3 (ref 3.77–5.28)
RH BLD: NEGATIVE
SODIUM SERPL-SCNC: 136 MMOL/L (ref 136–145)
SP GR UR STRIP: 1.03 (ref 1–1.03)
UROBILINOGEN UR QL STRIP: NORMAL
WBC # BLD AUTO: 12.11 10*3/MM3 (ref 3.4–10.8)

## 2021-11-06 PROCEDURE — 81003 URINALYSIS AUTO W/O SCOPE: CPT | Performed by: PHYSICIAN ASSISTANT

## 2021-11-06 PROCEDURE — 99283 EMERGENCY DEPT VISIT LOW MDM: CPT

## 2021-11-06 PROCEDURE — 25010000002 RHO D IMMUNE GLOBULIN 1500 UNIT/2ML SOLUTION PREFILLED SYRINGE: Performed by: PHYSICIAN ASSISTANT

## 2021-11-06 PROCEDURE — 86901 BLOOD TYPING SEROLOGIC RH(D): CPT | Performed by: PHYSICIAN ASSISTANT

## 2021-11-06 PROCEDURE — 76817 TRANSVAGINAL US OBSTETRIC: CPT

## 2021-11-06 PROCEDURE — 76815 OB US LIMITED FETUS(S): CPT

## 2021-11-06 PROCEDURE — 84702 CHORIONIC GONADOTROPIN TEST: CPT | Performed by: PHYSICIAN ASSISTANT

## 2021-11-06 PROCEDURE — 86900 BLOOD TYPING SEROLOGIC ABO: CPT | Performed by: PHYSICIAN ASSISTANT

## 2021-11-06 PROCEDURE — 93976 VASCULAR STUDY: CPT

## 2021-11-06 PROCEDURE — 96372 THER/PROPH/DIAG INJ SC/IM: CPT

## 2021-11-06 PROCEDURE — 80053 COMPREHEN METABOLIC PANEL: CPT | Performed by: PHYSICIAN ASSISTANT

## 2021-11-06 PROCEDURE — 85025 COMPLETE CBC W/AUTO DIFF WBC: CPT | Performed by: PHYSICIAN ASSISTANT

## 2021-11-06 RX ORDER — PNV NO.95/FERROUS FUM/FOLIC AC 28MG-0.8MG
1 TABLET ORAL DAILY
Qty: 30 TABLET | Refills: 0 | Status: SHIPPED | OUTPATIENT
Start: 2021-11-06

## 2021-11-06 RX ADMIN — HUMAN RHO(D) IMMUNE GLOBULIN 300 MCG: 1500 SOLUTION INTRAMUSCULAR; INTRAVENOUS at 18:16

## 2021-11-06 NOTE — ED TRIAGE NOTES
Pt reports vaginal bleeding and cramping that started last night. Pt reports she is 6wks pregnant. Pt reports she is spotting blood.     Pt was wearing a mask during assessment.  This RN wore appropriate PPE

## 2021-11-06 NOTE — ED NOTES
.Pt masked on arrival, RN wearing mask/goggles during encounter       Mala Vaughn, RN  11/06/21 [id number]

## 2021-11-06 NOTE — DISCHARGE INSTRUCTIONS
Although you are being discharged from the ED today, I encourage you to return for worsening symptoms. Things can, and do, change such that treatment at home with medication may not be adequate. Specifically I recommend returning for chest pain or discomfort, difficulty breathing, persistent vomiting or difficulty holding down liquids or medications, fever > 102.0 F, if you begin bleeding through 1 pad per hour or any other worsening or alarming symptoms.     Rest. Drink plenty of fluids.  Follow up with Dr. Mcclure on Monday to have your pregnancy hormone repeated.  Follow up with primary care provider for further management and to have blood pressure rechecked.

## 2021-11-06 NOTE — ED PROVIDER NOTES
EMERGENCY DEPARTMENT ENCOUNTER    Room Number:   Date seen: 2021  Time seen: 13:27 EDT  PCP: Provider, No Known    Spoken Language:  English  Language interpretation services not needed     CHIEF COMPLAINT: vaginal bleeding in pregnancy    OB/GYN: None established yet    HPI: Grisel Allen is a 31 y.o. female  presenting to the ED for evaluation of vaginal bleeding in pregnancy. The history is being obtained by the patient and by review of the medical chart.  The patient states that she noted light pink spotting last night on her toilet paper.  She also states that she has had bilateral lower abdominal cramping since that time.  She states that her last menstrual period was on 2021.  She is scheduled to have an ultrasound at the [facility] next Friday, but has not yet established an appointment with an OB/GYN.  The patient denies dysuria or hematuria.  She denies any sharp abdominal pain. The patient denies fever, cough, sore throat, shortness of breath, nausea, vomiting or diarrhea. She denies dysuria.    MEDICAL RECORD REVIEW:  Reviewed in [facility].     PAST MEDICAL HISTORY  Past Medical History:   Diagnosis Date   • ADD (attention deficit disorder)    • Anxiety    • Autoimmune disease (HCC)    • Behcet's disease (HCC)    • Behcet's syndrome (HCC)    • Depression    • Eye exam, routine    • Heroin abuse (HCC)    • Pap smear, as part of routine gynecological examination 2015    NORMAL       PAST SURGICAL HISTORY  Past Surgical History:   Procedure Laterality Date   • BREAST CYST EXCISION     • DECUBITUS ULCER EXCISION     • INCISION AND DRAINAGE PERIRECTAL ABSCESS Right 5/15/2019    Procedure: INCISION AND DRAINAGE OF PERIRECTAL ABSCESS;  Surgeon: Coy Pavon MD;  Location: [facility];  Service: General   • TONSILLECTOMY         FAMILY HISTORY  Family History   Problem Relation Age of Onset   • Diabetes Mother        SOCIAL HISTORY  Social History     Socioeconomic  History   • Marital status: Single   Tobacco Use   • Smoking status: Former Smoker     Packs/day: 0.50     Years: 5.00     Pack years: 2.50     Types: Cigarettes   • Smokeless tobacco: Current User   Substance and Sexual Activity   • Alcohol use: Not Currently     Comment: occ   • Drug use: No   • Sexual activity: Defer       CURRENT MEDICATIONS  Prior to Admission medications    Medication Sig Start Date End Date Taking? Authorizing Provider   amphetamine-dextroamphetamine (ADDERALL) 10 MG tablet Take 1 tablet by mouth 2 (Two) Times a Day. 8/7/19   Emergency, Nurse Epic, RN   cephALEXin (KEFLEX) 250 MG/5ML suspension Take 10 mL by mouth 2 (Two) Times a Day. 10/1/19   Mariia Fraser PA-C   cephalexin (KEFLEX) 500 MG capsule Take 1 capsule by mouth 3 (Three) Times a Day. 6/24/20   Vernon Curtis III, PA   metroNIDAZOLE (FLAGYL) 500 MG tablet Take 500 mg by mouth 3 (Three) Times a Day.    Provider, MD Taina   Nystatin (jacoby's magic mixture) Swish and spit 5 mL Every 4 (Four) Hours As Needed (mouth sores/pain). 11/27/20   Jairon Moore MD   promethazine (PHENERGAN) 25 MG tablet Take 1 tablet by mouth Every 6 (Six) Hours As Needed for Nausea or Vomiting. 6/24/20   Vernon Curtis III, PA       ALLERGIES  Patient has no known allergies.    REVIEW OF SYSTEMS  All systems reviewed and negative except for those discussed in HPI.     PHYSICAL EXAM  ED Triage Vitals [11/06/21 1325]   Temp Heart Rate Resp BP SpO2   98.4 °F (36.9 °C) 98 16 -- 99 %      Temp src Heart Rate Source Patient Position BP Location FiO2 (%)   Tympanic Monitor -- -- --       Physical Exam  Constitutional:       Appearance: Normal appearance.   HENT:      Head: Normocephalic and atraumatic.      Mouth/Throat:      Mouth: Mucous membranes are moist.   Eyes:      Extraocular Movements: Extraocular movements intact.      Pupils: Pupils are equal, round, and reactive to light.   Cardiovascular:      Rate and Rhythm: Normal rate  and regular rhythm.   Pulmonary:      Effort: Pulmonary effort is normal.      Breath sounds: Normal breath sounds.   Abdominal:      General: There is no distension.      Palpations: Abdomen is soft.      Tenderness: There is abdominal tenderness (Mild, and right lower quadrant and left lower quadrant).   Musculoskeletal:      Cervical back: Normal range of motion.   Skin:     General: Skin is warm and dry.   Neurological:      General: No focal deficit present.      Mental Status: She is alert and oriented to person, place, and time.   Psychiatric:         Mood and Affect: Mood normal.         Behavior: Behavior normal.         Thought Content: Thought content normal.         Judgment: Judgment normal.         PROCEDURES  Procedures  None    LABS  Recent Results (from the past 24 hour(s))   Comprehensive Metabolic Panel    Collection Time: 11/06/21  1:50 PM    Specimen: Blood   Result Value Ref Range    Glucose 78 65 - 99 mg/dL    BUN 15 6 - 20 mg/dL    Creatinine 0.54 (L) 0.57 - 1.00 mg/dL    Sodium 136 136 - 145 mmol/L    Potassium 3.8 3.5 - 5.2 mmol/L    Chloride 104 98 - 107 mmol/L    CO2 25.3 22.0 - 29.0 mmol/L    Calcium 8.9 8.6 - 10.5 mg/dL    Total Protein 6.8 6.0 - 8.5 g/dL    Albumin 3.90 3.50 - 5.20 g/dL    ALT (SGPT) 16 1 - 33 U/L    AST (SGOT) 12 1 - 32 U/L    Alkaline Phosphatase 77 39 - 117 U/L    Total Bilirubin <0.2 0.0 - 1.2 mg/dL    eGFR  African Amer >150 >60 mL/min/1.73    Globulin 2.9 gm/dL    A/G Ratio 1.3 g/dL    BUN/Creatinine Ratio 27.8 (H) 7.0 - 25.0    Anion Gap 6.7 5.0 - 15.0 mmol/L   Urinalysis With Microscopic If Indicated (No Culture) - Urine, Clean Catch    Collection Time: 11/06/21  1:50 PM    Specimen: Urine, Clean Catch   Result Value Ref Range    Color, UA Yellow Yellow, Straw    Appearance, UA Clear Clear    pH, UA 6.0 5.0 - 8.0    Specific Gravity, UA 1.029 1.005 - 1.030    Glucose, UA Negative Negative    Ketones, UA Negative Negative    Bilirubin, UA Negative Negative     Blood, UA Negative Negative    Protein, UA Negative Negative    Leuk Esterase, UA Negative Negative    Nitrite, UA Negative Negative    Urobilinogen, UA 1.0 E.U./dL 0.2 - 1.0 E.U./dL   ABO / Rh    Collection Time: 11/06/21  1:50 PM    Specimen: Blood   Result Value Ref Range    ABO Type O     RH type Negative    hCG, Quantitative, Pregnancy    Collection Time: 11/06/21  1:50 PM    Specimen: Blood   Result Value Ref Range    HCG Quantitative 5,611.00 mIU/mL   CBC Auto Differential    Collection Time: 11/06/21  1:50 PM    Specimen: Blood   Result Value Ref Range    WBC 12.11 (H) 3.40 - 10.80 10*3/mm3    RBC 3.44 (L) 3.77 - 5.28 10*6/mm3    Hemoglobin 11.2 (L) 12.0 - 15.9 g/dL    Hematocrit 33.1 (L) 34.0 - 46.6 %    MCV 96.2 79.0 - 97.0 fL    MCH 32.6 26.6 - 33.0 pg    MCHC 33.8 31.5 - 35.7 g/dL    RDW 17.6 (H) 12.3 - 15.4 %    RDW-SD 61.2 (H) 37.0 - 54.0 fl    MPV 9.3 6.0 - 12.0 fL    Platelets 299 140 - 450 10*3/mm3    Neutrophil % 52.9 42.7 - 76.0 %    Lymphocyte % 29.3 19.6 - 45.3 %    Monocyte % 13.6 (H) 5.0 - 12.0 %    Eosinophil % 1.1 0.3 - 6.2 %    Basophil % 0.5 0.0 - 1.5 %    Immature Grans % 2.6 (H) 0.0 - 0.5 %    Neutrophils, Absolute 6.41 1.70 - 7.00 10*3/mm3    Lymphocytes, Absolute 3.55 (H) 0.70 - 3.10 10*3/mm3    Monocytes, Absolute 1.65 (H) 0.10 - 0.90 10*3/mm3    Eosinophils, Absolute 0.13 0.00 - 0.40 10*3/mm3    Basophils, Absolute 0.06 0.00 - 0.20 10*3/mm3    Immature Grans, Absolute 0.31 (H) 0.00 - 0.05 10*3/mm3    nRBC 0.1 0.0 - 0.2 /100 WBC       RADIOLOGY  US Ob Limited 1 + Fetuses   Final Result      US Ob Transvaginal    (Results Pending)   US Testicular or Ovarian Vascular Limited    (Results Pending)       MEDICATIONS GIVEN IN THE ER  Medications   Rho D Immune Globulin (RHOPHYLAC) injection 300 mcg (300 mcg Intramuscular Given 11/6/21 1816)       MEDICAL DECISION MAKING, CONSULTS AND PROGRESS NOTES  All labs and all radiology studies were have been viewed and interpreted by me.   Discussion  below represents my analysis of pertinent findings related to patient's condition, differential diagnosis, treatment plan and final disposition.    Differential diagnosis includes but is not limited to:  -Threatened   -Menstrual period    PPE: The patient was placed in a face mask in first look. Patient was wearing facemask when I entered the room and throughout our encounter. I wore full protective equipment throughout this patient encounter including a face mask, eye protection and gloves. Hand hygiene was performed before donning protective equipment and after removal when leaving the room.    AS OF 19:07 EDT VITALS:    BP - 111/70  HR - 95  TEMP - 98.4 °F (36.9 °C) (Tympanic)  O2 SATS - 96%       The patient's history, physical exam, and lab findings were discussed with the physician, who also performed a face to face history and physical exam.  I discussed all results and noted any abnormalities with patient.  Discussed absoute need to recheck abnormalities with their family physician.  I answered any of the patient's questions.  Discussed plan for discharge, as there is no emergent indication for admission.  Pt is agreeable and understands need for follow up and repeat testing.  Pt is aware that discharge does not mean that nothing is wrong but it indicates no emergency is present and they must continue care with their family physician.  Pt is discharged with instructions to follow up with primary care doctor to have their blood pressure rechecked.     DIAGNOSIS   Diagnosis Plan   1. Vaginal bleeding in pregnancy, first trimester         DISPOSITION  ED Disposition     ED Disposition Condition Comment    Discharge Stable           FOLLOW UP  Janeth Mcclure MD  [address] [facility] [id number] [phone]      Make an appointment to have your blood work repeated on Monday      RX  Medications   Rho D Immune Globulin (RHOPHYLAC) injection 300 mcg (300 mcg Intramuscular Given 219)           Medication List      New Prescriptions    prenatal vitamin 28-0.8 28-0.8 MG tablet tablet  Take 1 tablet by mouth Daily.           Where to Get Your Medications      These medications were sent to [id number] - [place], [place] - [address]  AT [facility] - [phone]  - [phone] FX  [address] , [facility] [address]    Phone: [phone]   · prenatal vitamin 28-0.8 28-0.8 MG tablet tablet       Provider Attestation:  I personally reviewed the past medical history, past surgical history, social history, family history, current medications and allergies as they appear in the chart. I reviewed the patient's history, physical, lab/imaging results and overall care with Dr. Everett who is in agreement with the patient's treatment plan.    EMR Dragon/Transcription disclaimer:  Some of this encounter note is an electronic transcription/translation of spoken language to printed text. The electronic translation of spoken language may permit erroneous, or at times, nonsensical words or phrases to be inadvertently transcribed; Although I have reviewed the note for such errors, some may still exist.    Provider note signed by:         Tanisha Villa PA  11/06/21 5363

## 2025-03-01 ENCOUNTER — HOSPITAL ENCOUNTER (EMERGENCY)
Facility: HOSPITAL | Age: 35
Discharge: HOME | End: 2025-03-01
Attending: EMERGENCY MEDICINE
Payer: COMMERCIAL

## 2025-03-01 VITALS
RESPIRATION RATE: 16 BRPM | SYSTOLIC BLOOD PRESSURE: 132 MMHG | TEMPERATURE: 98.5 F | HEART RATE: 87 BPM | DIASTOLIC BLOOD PRESSURE: 84 MMHG | OXYGEN SATURATION: 93 %

## 2025-03-01 DIAGNOSIS — K08.89 PAIN, DENTAL: Primary | ICD-10-CM

## 2025-03-01 PROCEDURE — 99283 EMERGENCY DEPT VISIT LOW MDM: CPT

## 2025-03-01 RX ORDER — AMOXICILLIN 875 MG/1
875 TABLET, COATED ORAL 2 TIMES DAILY
Qty: 14 TABLET | Refills: 0 | Status: SHIPPED | OUTPATIENT
Start: 2025-03-01

## 2025-03-01 RX ADMIN — AMOXICILLIN AND CLAVULANATE POTASSIUM 1 TABLET: 875; 125 TABLET, FILM COATED ORAL at 08:43

## 2025-03-01 NOTE — Clinical Note
[facility] EMERGENCY DEPARTMENT  [address] [facility] [address]  Phone: [phone]    Grisel Allen was seen and treated in our emergency department on 3/1/2025.  She may return to work on 03/03/2025.         Thank you for choosing [facility].    Az Baez MD

## 2025-03-01 NOTE — ED PROVIDER NOTES
EMERGENCY DEPARTMENT ENCOUNTER    Room Number:  25/25  PCP: Keren Rodrigues APRN  Historian: Patient      HPI:  Chief Complaint: Left tooth pain   A complete HPI/ROS/PMH/PSH/SH/FH are unobtainable due to: None  Context: Grisel Allen is a 34 y.o. female who presents to the ED c/o left upper tooth pain.  Patient states she started having tooth pain yesterday.  Patient states has recently seen the dentist and know she is going to need partials on her bottom teeth.  Patient states has had some increased swelling last night.  Has had no fevers or chills.  Has had no difficulty opening mouth.  Has had some mild facial swelling.            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    Autoimmune disease     Depression     Sepsis 05/15/2019    Heroin abuse 05/16/2019    Cellulitis and abscess of buttock 05/16/2019    Community acquired pneumonia of left lung 05/16/2019    Sepsis associated hypotension 05/16/2019    Leukocytosis 05/16/2019    Hypokalemia 05/16/2019    Hyponatremia 05/16/2019     Resolved Ambulatory Problems     Diagnosis Date Noted    No Resolved Ambulatory Problems     Past Medical History:   Diagnosis Date    Behcet's disease     Eye exam, routine 2013    Pap smear, as part of routine gynecological examination 01/2015         PAST SURGICAL HISTORY  Past Surgical History:   Procedure Laterality Date    BREAST CYST EXCISION  2010    DECUBITUS ULCER EXCISION      INCISION AND DRAINAGE PERIRECTAL ABSCESS Right 5/15/2019    Procedure: INCISION AND DRAINAGE OF PERIRECTAL ABSCESS;  Surgeon: Coy Pavon MD;  Location: [facility];  Service: General    TONSILLECTOMY  2004         FAMILY HISTORY  Family History   Problem Relation Age of Onset    Diabetes Mother          SOCIAL HISTORY  Social History     Socioeconomic History    Marital status: Single   Tobacco Use     Smoking status: Former     Current packs/day: 0.50     Average packs/day: 0.5 packs/day for 5.0 years (2.5 ttl pk-yrs)     Types: Cigarettes    Smokeless tobacco: Current   Substance and Sexual Activity    Alcohol use: Not Currently     Comment: occ    Drug use: No    Sexual activity: Defer         ALLERGIES  Patient has no known allergies.        REVIEW OF SYSTEMS  Review of Systems   Tooth pain      PHYSICAL EXAM  ED Triage Vitals [03/01/25 0821]   Temp Heart Rate Resp BP SpO2   98.5 °F (36.9 °C) 87 16 -- 93 %      Temp src Heart Rate Source Patient Position BP Location FiO2 (%)   -- -- -- -- --       Physical Exam      GENERAL: no acute distress  HENT: nares patent.  Mild swelling and tenderness to left upper jaw.  No fluctuance. No gingival abscess  EYES: no scleral icterus  CV: regular rhythm, normal rate  RESPIRATORY: normal effort  MUSCULOSKELETAL: no deformity  NEURO: alert, moves all extremities, follows commands  PSYCH:  calm, cooperative  SKIN: warm, dry    Vital signs and nursing notes reviewed.          LAB RESULTS  No results found for this or any previous visit (from the past 24 hours).          RADIOLOGY  No Radiology Exams Resulted Within Past 24 Hours            PROCEDURES  Procedures          MEDICATIONS GIVEN IN ER  Medications   amoxicillin-clavulanate (AUGMENTIN) 875-125 MG per tablet 1 tablet (has no administration in time range)                   MEDICAL DECISION MAKING, PROGRESS, and CONSULTS    All labs have been independently reviewed by me.  All radiology studies have been reviewed by me and I have also reviewed the radiology report.   EKG's independently viewed and interpreted by me.  Discussion below represents my analysis of pertinent findings related to patient's condition, differential diagnosis, treatment plan and final disposition.      Additional sources:  - Discussed/ obtained information from independent historians: None    - External (non-ED) record review: Epic reviewed  patient seen urgent care 2/9/2025 for dental abscess    - Chronic or social conditions impacting care: None none    - Shared decision making:  none      Orders placed during this visit:  No orders of the defined types were placed in this encounter.        Additional orders considered but not ordered:  None        Differential diagnosis includes but is not limited to:    Dental abscess versus dental caries      Independent interpretation of labs, radiology studies, and discussions with consultants:  ED Course as of 03/01/25 0840   Sat Mar 01, 2025   0828 08:28 EST  Patient with dental pain.  Patient will be started on antibiotics.  She does have a dentist that she follows up with.  Patient has had no fevers.  Has no significant fluctuance.  Instructed to return here for any concerns. [SL]      ED Course User Index  [SL] Az Baez MD               DIAGNOSIS  Final diagnoses:   Pain, dental         DISPOSITION  DISCHARGE    Patient discharged in stable condition.    Reviewed implications of results, diagnosis, meds, responsibility to follow up, warning signs and symptoms of possible worsening, potential complications and reasons to return to ER, including worsening symptoms    Patient/Family voiced understanding of above instructions.    Discussed plan for discharge, as there is no emergent indication for admission. Patient referred to primary care provider for BP management due to today's BP. Pt/family is agreeable and understands need for follow up and repeat testing.  Pt is aware that discharge does not mean that nothing is wrong but it indicates no emergency is present that requires admission and they must continue care with follow-up as given below or physician of their choice.     FOLLOW-UP  Keren Rodrigues, APRN  [address] [id number] [phone]    Schedule an appointment as soon as possible for a visit            Medication List        New Prescriptions      amoxicillin  875 MG tablet  Commonly known as: AMOXIL  Take 1 tablet by mouth 2 (Two) Times a Day.               Where to Get Your Medications        These medications were sent to [id number] - [place], [place] - [address] - [phone] PH - [phone] FX  [address] [facility] [address]      Phone: [phone]   amoxicillin 875 MG tablet                  Latest Documented Vital Signs:  As of 08:40 EST  BP-   HR- 87 Temp- 98.5 °F (36.9 °C) O2 sat- 93%              --    Please note that portions of this were completed with a voice recognition program.       Note Disclaimer: At [facility], we believe that sharing information builds trust and better relationships. You are receiving this note because you are receiving care at [facility] or recently visited. It is possible you will see health information before a provider has talked with you about it. This kind of information can be easy to misunderstand. To help you fully understand what it means for your health, we urge you to discuss this note with your provider.            Az Baez MD  03/01/25 3208

## 2025-03-01 NOTE — ED NOTES
Pt arrives for dental pain. Pt thinks she may have a tooth abscess on the upper left side of her jaw. Pain started yesterday and now her face is swollen

## (undated) DEVICE — PANTY KNIT MATERN L/XL

## (undated) DEVICE — GAUZE,SPONGE,FLUFF,6"X6.75",STRL,10/TRAY: Brand: MEDLINE

## (undated) DEVICE — PAD,ABDOMINAL,8"X10",ST,LF: Brand: MEDLINE

## (undated) DEVICE — SPNG LAP 18X18IN LF STRL PK/5

## (undated) DEVICE — PREMIUM WET SKIN PREP TRAY: Brand: MEDLINE INDUSTRIES, INC.

## (undated) DEVICE — GLV SURG BIOGEL LTX PF 8

## (undated) DEVICE — LOU MINOR PROCEDURE: Brand: MEDLINE INDUSTRIES, INC.